# Patient Record
Sex: MALE | Race: WHITE | NOT HISPANIC OR LATINO | URBAN - METROPOLITAN AREA
[De-identification: names, ages, dates, MRNs, and addresses within clinical notes are randomized per-mention and may not be internally consistent; named-entity substitution may affect disease eponyms.]

---

## 2017-01-06 ENCOUNTER — OFFICE VISIT - HEALTHEAST (OUTPATIENT)
Dept: FAMILY MEDICINE | Facility: CLINIC | Age: 52
End: 2017-01-06

## 2017-01-06 DIAGNOSIS — E78.00 HYPERCHOLESTEREMIA: ICD-10-CM

## 2017-01-06 DIAGNOSIS — E88.810 METABOLIC SYNDROME: ICD-10-CM

## 2017-01-06 DIAGNOSIS — R06.83 SNORES: ICD-10-CM

## 2017-01-06 DIAGNOSIS — E78.1 HYPERTRIGLYCERIDEMIA: ICD-10-CM

## 2017-01-06 NOTE — ASSESSMENT & PLAN NOTE
51 y.o. year old male in clinic today to discuss treatment of the following conditions through diet and lifestyle modification and weight loss:  1. BMI 36.0-36.9,adult    2. Metabolic syndrome    3. Hypertriglyceridemia    4. Hypercholesteremia    5. Snores      The patient's efforts this past month were successful as evidenced by feeling well while losing weight.  No obvious side effects from metformin.  The patient has not started phentermine.  Hunger control is been quite good especially considering that this was the first month for this patient.   -Continue metformin.  Consider phentermine in the future.   -The patient will meet with nutrition and ways to wellness discuss breakfast options.   -The patient has been discouraged from utilizing protein shakes and protein bars as much as he has.

## 2017-01-12 ENCOUNTER — OFFICE VISIT - HEALTHEAST (OUTPATIENT)
Dept: AUDIOLOGY | Facility: CLINIC | Age: 52
End: 2017-01-12

## 2017-01-12 DIAGNOSIS — H93.13 TINNITUS, BILATERAL: ICD-10-CM

## 2017-01-12 DIAGNOSIS — H90.3 SENSORINEURAL HEARING LOSS, BILATERAL: ICD-10-CM

## 2017-02-03 ENCOUNTER — OFFICE VISIT - HEALTHEAST (OUTPATIENT)
Dept: FAMILY MEDICINE | Facility: CLINIC | Age: 52
End: 2017-02-03

## 2017-02-03 DIAGNOSIS — R73.09 ELEVATED HEMOGLOBIN A1C: ICD-10-CM

## 2017-02-03 DIAGNOSIS — E88.810 METABOLIC SYNDROME: ICD-10-CM

## 2017-02-03 DIAGNOSIS — E78.1 HYPERTRIGLYCERIDEMIA: ICD-10-CM

## 2017-02-03 DIAGNOSIS — R06.83 SNORES: ICD-10-CM

## 2017-02-03 DIAGNOSIS — E78.00 HYPERCHOLESTEREMIA: ICD-10-CM

## 2017-02-03 DIAGNOSIS — E55.9 AVITAMINOSIS D: ICD-10-CM

## 2017-02-03 NOTE — ASSESSMENT & PLAN NOTE
51 y.o. year old male in clinic today to discuss treatment of the following conditions through diet and lifestyle modification and weight loss:  1. BMI 36.0-36.9,adult    2. Hypercholesteremia    3. Hypertriglyceridemia    4. Snores    5. Metabolic syndrome      The patient's efforts this past month were successful as evidenced by lack of appetite and energy level.  Continues to make good food choices.    - continue metformin   - taper EtOH calories   - has not taken metformin   - fasting labs in April. Orders placed.

## 2017-03-27 ENCOUNTER — COMMUNICATION - HEALTHEAST (OUTPATIENT)
Dept: FAMILY MEDICINE | Facility: CLINIC | Age: 52
End: 2017-03-27

## 2017-03-27 ENCOUNTER — OFFICE VISIT - HEALTHEAST (OUTPATIENT)
Dept: FAMILY MEDICINE | Facility: CLINIC | Age: 52
End: 2017-03-27

## 2017-03-27 DIAGNOSIS — E78.1 HYPERTRIGLYCERIDEMIA: ICD-10-CM

## 2017-03-27 DIAGNOSIS — R06.83 SNORES: ICD-10-CM

## 2017-03-27 DIAGNOSIS — E78.00 HYPERCHOLESTEREMIA: ICD-10-CM

## 2017-03-27 DIAGNOSIS — E88.810 METABOLIC SYNDROME: ICD-10-CM

## 2017-03-27 NOTE — ASSESSMENT & PLAN NOTE
51 y.o. year old male in clinic today to discuss treatment of the following conditions through diet and lifestyle modification and weight loss:  1. BMI 36.0-36.9,adult    2. Metabolic syndrome    3. Snores    4. Hypertriglyceridemia    5. Hypercholesteremia      The patient's efforts this past month were successful as evidenced by continued success at losing weight.  I did recommend that he rededicate himself to tracking as it sounds like at times he is consuming excessive calories.  Unclear if he is actually plateaued which is his main concern today.  I also recommended increasing his physical activity during the week as his schedule allows.  We will plan on fasting lab work during the month of May.

## 2017-04-24 ENCOUNTER — OFFICE VISIT - HEALTHEAST (OUTPATIENT)
Dept: FAMILY MEDICINE | Facility: CLINIC | Age: 52
End: 2017-04-24

## 2017-04-24 DIAGNOSIS — R06.83 SNORES: ICD-10-CM

## 2017-04-24 DIAGNOSIS — E88.810 METABOLIC SYNDROME: ICD-10-CM

## 2017-04-24 DIAGNOSIS — E78.1 HYPERTRIGLYCERIDEMIA: ICD-10-CM

## 2017-04-24 DIAGNOSIS — E78.00 HYPERCHOLESTEREMIA: ICD-10-CM

## 2017-04-24 NOTE — ASSESSMENT & PLAN NOTE
51 y.o. year old male in clinic today to discuss treatment of the following conditions through diet and lifestyle modification and weight loss:  1. BMI 36.0-36.9,adult    2. Snores    3. Hypertriglyceridemia    4. Hypercholesteremia    5. Metabolic syndrome      The patient's efforts this past month were successful as evidenced by ongoing weight loss, increased physical activity recognition of pattern.  Reviewed Inbody analysis.   - continue to increase physical activity.   - work on breakfast

## 2017-06-19 ENCOUNTER — OFFICE VISIT - HEALTHEAST (OUTPATIENT)
Dept: FAMILY MEDICINE | Facility: CLINIC | Age: 52
End: 2017-06-19

## 2017-06-19 DIAGNOSIS — E78.00 HYPERCHOLESTEREMIA: ICD-10-CM

## 2017-06-19 DIAGNOSIS — E78.1 HYPERTRIGLYCERIDEMIA: ICD-10-CM

## 2017-06-19 DIAGNOSIS — E88.810 METABOLIC SYNDROME: ICD-10-CM

## 2017-06-19 DIAGNOSIS — R06.83 SNORES: ICD-10-CM

## 2017-06-19 NOTE — ASSESSMENT & PLAN NOTE
51 y.o. year old male in clinic today to discuss treatment of the following conditions through diet and lifestyle modification and weight loss:  1. BMI 36.0-36.9,adult    2. Snores    3. Metabolic syndrome    4. Hypertriglyceridemia    5. Hypercholesteremia      The patient's efforts this past month were successful as evidenced by weight stability but the patient feels like he has stalled out.  I recommend that the patient focus on logging/tracking.   - goal remains less than 200.   - trial of contrave.  The patient was intolerant of phentermine after several days.  For the past 6 months, he has been diligently tracking his food intake and focusing on increasing his physical activity.  He did find some initial success but he has prematurely plateaued.  He still is at risk for cholesterol related illnesses such as heart disease.  Additionally has metabolic syndrome and snoring.  I believe that he would benefit from a trial and possibly longer term use of contrave given his strong and sometimes overwhelming symptoms of food cravings.  The medication was sent in the pharmacy.  It appears that he will require prior authorization process.  I believe this medication is necessary to reduce his risk of heart disease in the future.

## 2017-08-04 ENCOUNTER — OFFICE VISIT - HEALTHEAST (OUTPATIENT)
Dept: FAMILY MEDICINE | Facility: CLINIC | Age: 52
End: 2017-08-04

## 2017-08-04 DIAGNOSIS — F32.9 MDD (MAJOR DEPRESSIVE DISORDER): ICD-10-CM

## 2017-08-04 DIAGNOSIS — E78.00 HYPERCHOLESTEREMIA: ICD-10-CM

## 2017-08-04 DIAGNOSIS — E88.810 METABOLIC SYNDROME: ICD-10-CM

## 2017-08-04 DIAGNOSIS — E78.1 HYPERTRIGLYCERIDEMIA: ICD-10-CM

## 2017-08-04 DIAGNOSIS — R06.83 SNORES: ICD-10-CM

## 2017-08-04 NOTE — ASSESSMENT & PLAN NOTE
51 y.o. year old male in clinic today to discuss treatment of the following conditions through diet and lifestyle modification and weight loss:  1. BMI 36.0-36.9,adult    2. MDD (major depressive disorder)    3. Hypercholesteremia    4. Hypertriglyceridemia    5. Snores    6. Metabolic syndrome      The patient's efforts this past month were successful as evidenced by weight loss although I am concerned that the patient is essentially skipping his breakfast and lunch because he is now on phentermine.  The patient has been taking phentermine to suppress his appetite.  He is consuming a protein bar for breakfast and lunch.  He has not been hungry.  He finds that his mood is been labile.  -The patient was encouraged to eat real food for breakfast and lunch.  -Stop phentermine.  Start diethylpropion.  -Plan to review the patient's intake at our next visit.  The patient was told that I am concerned he will struggle with maintenance of weight loss given the type of weight loss is experience over the past month.

## 2017-08-10 ENCOUNTER — COMMUNICATION - HEALTHEAST (OUTPATIENT)
Dept: FAMILY MEDICINE | Facility: CLINIC | Age: 52
End: 2017-08-10

## 2017-08-11 ENCOUNTER — RECORDS - HEALTHEAST (OUTPATIENT)
Dept: ADMINISTRATIVE | Facility: OTHER | Age: 52
End: 2017-08-11

## 2017-12-04 ENCOUNTER — RECORDS - HEALTHEAST (OUTPATIENT)
Dept: ADMINISTRATIVE | Facility: OTHER | Age: 52
End: 2017-12-04

## 2017-12-11 ENCOUNTER — OFFICE VISIT - HEALTHEAST (OUTPATIENT)
Dept: FAMILY MEDICINE | Facility: CLINIC | Age: 52
End: 2017-12-11

## 2017-12-11 DIAGNOSIS — E78.00 HYPERCHOLESTEREMIA: ICD-10-CM

## 2017-12-11 DIAGNOSIS — E88.810 METABOLIC SYNDROME: ICD-10-CM

## 2017-12-11 DIAGNOSIS — R73.09 ELEVATED HEMOGLOBIN A1C: ICD-10-CM

## 2017-12-11 DIAGNOSIS — R06.83 SNORES: ICD-10-CM

## 2017-12-11 DIAGNOSIS — Z13.1 SCREENING FOR DIABETES MELLITUS: ICD-10-CM

## 2017-12-11 DIAGNOSIS — Z23 NEED FOR INFLUENZA VACCINATION: ICD-10-CM

## 2017-12-11 DIAGNOSIS — E78.1 HYPERTRIGLYCERIDEMIA: ICD-10-CM

## 2017-12-11 DIAGNOSIS — E55.9 AVITAMINOSIS D: ICD-10-CM

## 2017-12-11 LAB
CHOLEST SERPL-MCNC: 212 MG/DL
FASTING STATUS PATIENT QL REPORTED: YES
HBA1C MFR BLD: 5.4 % (ref 3.5–6)
HDLC SERPL-MCNC: 45 MG/DL
LDLC SERPL CALC-MCNC: 134 MG/DL
TRIGL SERPL-MCNC: 166 MG/DL

## 2017-12-11 NOTE — ASSESSMENT & PLAN NOTE
52 y.o. year old male in clinic today to discuss treatment of the following conditions through diet and lifestyle modification and weight loss:  1. BMI 36.0-36.9,adult    2. Need for influenza vaccination    3. Hypercholesteremia    4. Hypertriglyceridemia    5. Snores    6. Metabolic syndrome    7. Screening for diabetes mellitus    8. Avitaminosis D      This patient returns to clinic after a 3-4 month absence.  He found diethylpropion to be unhelpful in that it caused emotional lability.  This is discontinued.  He takes metformin intermittently.Most importantly, the patient now has access to a kitchen as he is completed a lengthy remodel of his home.  He is excited to resume.  -We discussed and revised his macronutrient targets.  -Continue metformin.  We will repeat laboratory testing today to support the diagnosis of metabolic syndrome.  -Been 12-13 months since the last time he had fasting lab work.  Fasting lab work today.  -Return to clinic in 1 month.

## 2018-01-08 ENCOUNTER — COMMUNICATION - HEALTHEAST (OUTPATIENT)
Dept: FAMILY MEDICINE | Facility: CLINIC | Age: 53
End: 2018-01-08

## 2019-01-23 ENCOUNTER — OFFICE VISIT - HEALTHEAST (OUTPATIENT)
Dept: FAMILY MEDICINE | Facility: CLINIC | Age: 54
End: 2019-01-23

## 2019-01-23 DIAGNOSIS — E88.810 METABOLIC SYNDROME: ICD-10-CM

## 2019-01-23 DIAGNOSIS — E78.00 HYPERCHOLESTEREMIA: ICD-10-CM

## 2019-01-23 DIAGNOSIS — E66.01 CLASS 2 SEVERE OBESITY DUE TO EXCESS CALORIES WITH SERIOUS COMORBIDITY AND BODY MASS INDEX (BMI) OF 35.0 TO 35.9 IN ADULT (H): ICD-10-CM

## 2019-01-23 DIAGNOSIS — R06.83 SNORES: ICD-10-CM

## 2019-01-23 DIAGNOSIS — E78.1 HYPERTRIGLYCERIDEMIA: ICD-10-CM

## 2019-01-23 DIAGNOSIS — F51.04 PSYCHOPHYSIOLOGICAL INSOMNIA: ICD-10-CM

## 2019-01-23 DIAGNOSIS — Z00.00 ROUTINE GENERAL MEDICAL EXAMINATION AT A HEALTH CARE FACILITY: ICD-10-CM

## 2019-01-23 DIAGNOSIS — E55.9 AVITAMINOSIS D: ICD-10-CM

## 2019-01-23 DIAGNOSIS — F41.1 ANXIETY STATE: ICD-10-CM

## 2019-01-23 DIAGNOSIS — E66.812 CLASS 2 SEVERE OBESITY DUE TO EXCESS CALORIES WITH SERIOUS COMORBIDITY AND BODY MASS INDEX (BMI) OF 35.0 TO 35.9 IN ADULT (H): ICD-10-CM

## 2019-01-23 DIAGNOSIS — N40.2 PROSTATE NODULE: ICD-10-CM

## 2019-01-23 ASSESSMENT — MIFFLIN-ST. JEOR: SCORE: 2077.97

## 2019-01-23 NOTE — ASSESSMENT & PLAN NOTE
The patient has gained weight since his most recent visit 13 months ago.  He and I had a lengthy conversation regarding strategies towards weight loss.  At this time, he is patient's as he struggled with side effects previously.  We discussed a framework of intermittent fasting, careful planning/prepping of meals, increase physical activity.  The patient was given a list of resources to help facilitate the above.  I have recommended that he return to clinic in 3 months for a follow-up appointment and that we consider (or reconsider) anorexigenic medications at that time.  He may be an excellent candidate for locaserin or liraglutide assuming he gets coverage.

## 2019-01-23 NOTE — ASSESSMENT & PLAN NOTE
This patient returns for an annual exam.  Is a new complaint of insomnia which is likely multifactorial and discussed elsewhere.  He and I had a lengthy conversation regarding overall health as it relates to his weight and a previous diagnosis of metabolic syndrome.  He will work to improve this condition.  We reviewed his family health history.  We have been checking a PSA over the past few years given his previous history of a prostate nodule which was found incidentally individual rectal exam and a routine exam in his 30s.  Anxiety/depression are reasonably well controlled and anticipate improvement with improved sleep.

## 2019-01-23 NOTE — ASSESSMENT & PLAN NOTE
New complaint today.   Comorbid anxiety and depression which are reasonably well controlled.  Early awakening insomnia with some rumination.  We discussed sleep hygiene as well as increased/improved nutrition as an initial response.  Patient was given a lengthy handout.  He should also consider a cognitive behavioral therapy-insomnia mino as a more direct intervention.

## 2019-01-23 NOTE — ASSESSMENT & PLAN NOTE
Fasting lab work will be completed in the next week to evaluate current status of this condition.  Anticipate worsening given his weight gain.  Suggests he may disproportionally benefit from liraglutide as well as an intermittent fasting framework.

## 2019-01-23 NOTE — ASSESSMENT & PLAN NOTE
Worsening with weight gain.  Consider referral for sleep medicine.  Patient declines at this time.

## 2019-01-23 NOTE — ASSESSMENT & PLAN NOTE
The patient's symptoms overall are well controlled although he has developed early awakening insomnia with some rumination.  We discussed sleep hygiene as well as increased/improved nutrition as an initial response.  Patient was given a lengthy handout.  He should also consider a cognitive behavioral therapy-insomnia mino as a more direct intervention.

## 2019-03-25 ENCOUNTER — COMMUNICATION - HEALTHEAST (OUTPATIENT)
Dept: FAMILY MEDICINE | Facility: CLINIC | Age: 54
End: 2019-03-25

## 2019-03-25 ENCOUNTER — COMMUNICATION - HEALTHEAST (OUTPATIENT)
Dept: LAB | Facility: CLINIC | Age: 54
End: 2019-03-25

## 2019-03-25 DIAGNOSIS — E78.1 HYPERTRIGLYCERIDEMIA: ICD-10-CM

## 2019-03-25 DIAGNOSIS — E55.9 AVITAMINOSIS D: ICD-10-CM

## 2019-03-25 DIAGNOSIS — N40.2 PROSTATE NODULE: ICD-10-CM

## 2019-03-25 DIAGNOSIS — R73.09 ELEVATED HEMOGLOBIN A1C: ICD-10-CM

## 2019-03-29 ENCOUNTER — AMBULATORY - HEALTHEAST (OUTPATIENT)
Dept: LAB | Facility: CLINIC | Age: 54
End: 2019-03-29

## 2019-03-29 DIAGNOSIS — E55.9 AVITAMINOSIS D: ICD-10-CM

## 2019-03-29 DIAGNOSIS — E78.1 HYPERTRIGLYCERIDEMIA: ICD-10-CM

## 2019-03-29 DIAGNOSIS — N40.2 PROSTATE NODULE: ICD-10-CM

## 2019-03-29 DIAGNOSIS — R73.09 ELEVATED HEMOGLOBIN A1C: ICD-10-CM

## 2019-03-29 LAB
25(OH)D3 SERPL-MCNC: 15.2 NG/ML (ref 30–80)
25(OH)D3 SERPL-MCNC: 15.2 NG/ML (ref 30–80)
CHOLEST SERPL-MCNC: 226 MG/DL
FASTING STATUS PATIENT QL REPORTED: YES
HBA1C MFR BLD: 5.5 % (ref 3.5–6)
HDLC SERPL-MCNC: 50 MG/DL
LDLC SERPL CALC-MCNC: 141 MG/DL
PSA SERPL-MCNC: 0.9 NG/ML (ref 0–3.5)
TRIGL SERPL-MCNC: 173 MG/DL

## 2019-04-02 ENCOUNTER — AMBULATORY - HEALTHEAST (OUTPATIENT)
Dept: FAMILY MEDICINE | Facility: CLINIC | Age: 54
End: 2019-04-02

## 2019-04-02 DIAGNOSIS — E55.9 AVITAMINOSIS D: ICD-10-CM

## 2021-05-27 NOTE — TELEPHONE ENCOUNTER
Cancelled orders in your box, I will send a my-chart message to schedule a lab appointment    Thank you,  Kayli Funes LPN

## 2021-05-27 NOTE — TELEPHONE ENCOUNTER
Orders being requested:  Lipid, vitamin D, A1C  Reason service is needed/diagnosis:  Follow up labs  When are orders needed by: ASAP  Where to send Orders: Chart  Okay to leave detailed message?  Yes

## 2021-05-27 NOTE — TELEPHONE ENCOUNTER
Patient coming to  lab on 3/29/19 at 0800 for labs. Please review chart and place orders.     Thank you!

## 2021-05-30 VITALS — BODY MASS INDEX: 34.72 KG/M2 | WEIGHT: 256 LBS

## 2021-05-30 VITALS — WEIGHT: 243.9 LBS | BODY MASS INDEX: 33.08 KG/M2

## 2021-05-30 VITALS — BODY MASS INDEX: 34.11 KG/M2 | WEIGHT: 251.5 LBS

## 2021-05-30 VITALS — BODY MASS INDEX: 33.23 KG/M2 | WEIGHT: 245 LBS

## 2021-05-31 VITALS — WEIGHT: 243 LBS | BODY MASS INDEX: 32.96 KG/M2

## 2021-05-31 VITALS — BODY MASS INDEX: 33.5 KG/M2 | WEIGHT: 247 LBS

## 2021-05-31 VITALS — WEIGHT: 233 LBS | BODY MASS INDEX: 31.6 KG/M2

## 2021-06-02 VITALS — HEIGHT: 73 IN | BODY MASS INDEX: 35.12 KG/M2 | WEIGHT: 265 LBS

## 2021-06-08 NOTE — PROGRESS NOTES
"Assessment and Plan:     BMI 36.0-36.9,adult  51 y.o. year old male in clinic today to discuss treatment of the following conditions through diet and lifestyle modification and weight loss:  1. BMI 36.0-36.9,adult    2. Metabolic syndrome    3. Hypertriglyceridemia    4. Hypercholesteremia    5. Snores      The patient's efforts this past month were successful as evidenced by feeling well while losing weight.  No obvious side effects from metformin.  The patient has not started phentermine.  Hunger control is been quite good especially considering that this was the first month for this patient.   -Continue metformin.  Consider phentermine in the future.   -The patient will meet with nutrition and ways to wellness discuss breakfast options.   -The patient has been discouraged from utilizing protein shakes and protein bars as much as he has.    Follow up in 1 month.  Continue medications.  Continue supplements.    Recommend increasing movement throughout the day--sitting less, moving more.  Will increase activity over time to reach a goal of at least 150 minutes of moderate exercise each week.  Behavior modification:  Cognitive restructuring exercises, journaling stressors, triggers for food cravings.  Dietary Intervention:  Reduced calorie, reduced carbohydrates, whole food diet.  Greater than 50% of this 15 minute visit was spent in counseling regarding patient's obesity, medications, dietary, exercise, and behavior modification recommendations.      Subjective  Patient presents for treatment of chronic, comorbid conditions using intensive therapeutic lifestyle interventions including nutrition, physical activity, and behavior management.    Feels good  Dropped down below 250 lbs  Wants to add real food.  Carbs are hard.  \"I realize that this is about carbohydrate addiction\" - feels like this is quiting smoking.  Using lose-it.    Are you experiencing any side effects to the medications:  No, only taking " metforming  Hunger control:  good  Exercise was discussed: yes  Taking supplements:  yes  Discussed journaling food:  yes  Patient is pleased with the current results:  yes  The patient is following the nutrition plan:  yes  Barriers to losing weight:  Behavior:  inadequate exercise    Patient Active Problem List   Diagnosis     Allergies     Anxiety Disorder NOS     MDD (major depressive disorder)     Insomnia     BMI 36.0-36.9,adult     Metabolic syndrome     Hypertriglyceridemia     Hypercholesteremia     Snores       Current Outpatient Prescriptions on File Prior to Visit   Medication Sig Dispense Refill     ergocalciferol (ERGOCALCIFEROL) 50,000 unit capsule Take 1 capsule (50,000 Units total) by mouth once a week for 12 doses. 12 capsule 0     metFORMIN (GLUCOPHAGE XR) 500 MG 24 hr tablet Take 1 tablet (500 mg total) by mouth daily with supper. 90 tablet 1     No current facility-administered medications on file prior to visit.        Objective:  Vitals:    01/06/17 0905   BP: 124/70   Pulse: 72     Initial Weight: 270 lbs  Weight: 256 lb (116.1 kg)  Weight loss from initial: 14  % Weight loss: 5.19 %  Body mass index is 34.72 kg/(m^2).  General:  Patient is alert, pleasant, no distress.  Patient is obese.    This note has been dictated using voice recognition software. Any grammatical or context distortions are unintentional and inherent to the software

## 2021-06-08 NOTE — PROGRESS NOTES
Audiology Report:    Referring Provider:  Dr. Chowdhury    History:  Jones Miller is seen today for comprehensive hearing evaluation. He has a history of difficulty hearing and tinnitus for the past 2 years. He states that he has a difficult time hearing people when he is in loud settings. He reports that he experiences constant bilateral ringing in his ears that is not very noticeable or bothersome. He states he has a positive history of noise exposure due to listening to amplified music without hearing protection when he was younger. He reports he now utilizes hearing protection regularly when needed. He denies a history of otalgia, dizziness, aural fullness and otorrhea, ear surgery, and family history of hearing loss..    Results:     Left Ear Right Ear   Otoscopy clear canals clear canals   Pure Tone Audiometry normal hearing 250-1000 Hz sloping to mild sensorineural hearing loss   normal hearing 250-1000 Hz sloping to mild sensorineural hearing loss   Word Recognition excellent excellent   Tympanometry normal (Type A)  normal (Type A)     Transducer: Insert earphones    Reliability was good  and there was good  SRT to PTA agreement.       Plan:  Results are discussed in detail.  He should return for retesting in 2-3 years.  The patient is counseled on tinnitus, hearing protection, and listening strategies. Hearing aid candidacy was briefly discussed.    Please see audiogram under  media  and  audiogram  in the patient s chart.     Felisa Webber., CCC-A  [place] Licensed Audiologist #[id number]

## 2021-06-08 NOTE — PROGRESS NOTES
Assessment and Plan:     BMI 36.0-36.9,adult  51 y.o. year old male in clinic today to discuss treatment of the following conditions through diet and lifestyle modification and weight loss:  1. BMI 36.0-36.9,adult    2. Hypercholesteremia    3. Hypertriglyceridemia    4. Snores    5. Metabolic syndrome      The patient's efforts this past month were successful as evidenced by lack of appetite and energy level.  Continues to make good food choices.    - continue metformin   - taper EtOH calories   - has not taken metformin   - fasting labs in April. Orders placed.     Follow up in 1 month.  Continue medications.  Continue supplements.    Recommend increasing movement throughout the day--sitting less, moving more.  Will increase activity over time to reach a goal of at least 150 minutes of moderate exercise each week.  Behavior modification:  Cognitive restructuring exercises, journaling stressors, triggers for food cravings.  Dietary Intervention:  Reduced calorie, reduced carbohydrates, whole food diet.  Greater than 50% of this 15 minute visit was spent in counseling regarding patient's obesity, medications, dietary, exercise, and behavior modification recommendations.      Subjective  Patient presents for treatment of chronic, comorbid conditions using intensive therapeutic lifestyle interventions including nutrition, physical activity, and behavior management.    Doing pretty good.  Struggles with variety.  Met with .  Protein drinks in morning.  Not hungry.  Continues to be a carb addict.  Stress is trigger.  Physical activity: Walking.  3 and 5 mile walks on weekends.  Hikes with dog.      Are you experiencing any side effects to the medications:  No  Hunger control:  good  Exercise was discussed: yes  Taking supplements:  yes  Discussed journaling food:  yes  Patient is pleased with the current results:  yes  The patient is following the nutrition plan:  yes  Barriers to losing weight:  Behavior:   inadequate exercise    Patient Active Problem List   Diagnosis     Allergies     Anxiety Disorder NOS     MDD (major depressive disorder)     Insomnia     BMI 36.0-36.9,adult     Metabolic syndrome     Hypertriglyceridemia     Hypercholesteremia     Snores       Current Outpatient Prescriptions on File Prior to Visit   Medication Sig Dispense Refill     metFORMIN (GLUCOPHAGE XR) 500 MG 24 hr tablet Take 1 tablet (500 mg total) by mouth daily with supper. 90 tablet 1     [DISCONTINUED] phentermine (ADIPEX-P) 37.5 mg tablet Take 18.75 mg by mouth 2 (two) times a day.       No current facility-administered medications on file prior to visit.        Objective:  Vitals:    02/03/17 1600   BP: 124/72   Pulse: 68     Initial Weight: 270 lbs  Weight: 251 lb 8 oz (114.1 kg)  Weight loss from initial: 18.5  % Weight loss: 6.85 %  Body mass index is 34.11 kg/(m^2).  General:  Patient is alert, pleasant, no distress.  Patient is obese.    This note has been dictated using voice recognition software. Any grammatical or context distortions are unintentional and inherent to the software

## 2021-06-09 NOTE — PROGRESS NOTES
"Assessment and Plan:     BMI 36.0-36.9,adult  51 y.o. year old male in clinic today to discuss treatment of the following conditions through diet and lifestyle modification and weight loss:  1. BMI 36.0-36.9,adult    2. Metabolic syndrome    3. Snores    4. Hypertriglyceridemia    5. Hypercholesteremia      The patient's efforts this past month were successful as evidenced by continued success at losing weight.  I did recommend that he rededicate himself to tracking as it sounds like at times he is consuming excessive calories.  Unclear if he is actually plateaued which is his main concern today.  I also recommended increasing his physical activity during the week as his schedule allows.  We will plan on fasting lab work during the month of May.    Follow up in 1 month.  Continue medications.  Continue supplements.    Recommend increasing movement throughout the day--sitting less, moving more.  Will increase activity over time to reach a goal of at least 150 minutes of moderate exercise each week.  Behavior modification:  Cognitive restructuring exercises, journaling stressors, triggers for food cravings.  Dietary Intervention:  Reduced calorie, reduced carbohydrates, whole food diet.  Greater than 50% of this 15 minute visit was spent in counseling regarding patient's obesity, medications, dietary, exercise, and behavior modification recommendations.      Subjective  Patient presents for treatment of chronic, comorbid conditions using intensive therapeutic lifestyle interventions including nutrition, physical activity, and behavior management.    Hitting wall.  Not seeing results as quickly.  Not tracking.  Does not believe that he is tracking.  Walking on weekends.    Carb calories.  \"sugar alcohols\"    Are you experiencing any side effects to the medications:  No  Hunger control:  good  Exercise was discussed: yes  Taking supplements:  yes  Discussed journaling food:  yes  Patient is pleased with the current " results:  yes  The patient is following the nutrition plan:  yes  Barriers to losing weight:  Behavior:  inadequate exercise    Patient Active Problem List   Diagnosis     Allergies     Anxiety Disorder NOS     MDD (major depressive disorder)     Insomnia     BMI 36.0-36.9,adult     Metabolic syndrome     Hypertriglyceridemia     Hypercholesteremia     Snores       Current Outpatient Prescriptions on File Prior to Visit   Medication Sig Dispense Refill     [DISCONTINUED] metFORMIN (GLUCOPHAGE XR) 500 MG 24 hr tablet Take 1 tablet (500 mg total) by mouth daily with supper. 90 tablet 1     No current facility-administered medications on file prior to visit.        Objective:  Vitals:    03/27/17 0817   BP: 124/64   Pulse: 74     Initial Weight: 270 lbs  Weight: 245 lb (111.1 kg)  Weight loss from initial: 25  % Weight loss: 9.26 %  Body mass index is 33.23 kg/(m^2).  General:  Patient is alert, pleasant, no distress.  Patient is obese.    This note has been dictated using voice recognition software. Any grammatical or context distortions are unintentional and inherent to the software

## 2021-06-10 NOTE — PROGRESS NOTES
Assessment and Plan:     BMI 36.0-36.9,adult  51 y.o. year old male in clinic today to discuss treatment of the following conditions through diet and lifestyle modification and weight loss:  1. BMI 36.0-36.9,adult    2. Snores    3. Hypertriglyceridemia    4. Hypercholesteremia    5. Metabolic syndrome      The patient's efforts this past month were successful as evidenced by ongoing weight loss, increased physical activity recognition of pattern.  Reviewed Inbody analysis.   - continue to increase physical activity.   - work on breakfast    Follow up in 1 month.  Continue medications.  Continue supplements.    Recommend increasing movement throughout the day--sitting less, moving more.  Will increase activity over time to reach a goal of at least 150 minutes of moderate exercise each week.  Behavior modification:  Cognitive restructuring exercises, journaling stressors, triggers for food cravings.  Dietary Intervention:  Reduced calorie, reduced carbohydrates, whole food diet.  Greater than 50% of this 15 minute visit was spent in counseling regarding patient's obesity, medications, dietary, exercise, and behavior modification recommendations.      Subjective  Patient presents for treatment of chronic, comorbid conditions using intensive therapeutic lifestyle interventions including nutrition, physical activity, and behavior management.    Walking more.  Has a routine.  Carbs slipping back into diet. (beer)  Had anxiety with phentermine.      Are you experiencing any side effects to the medications:  No  Hunger control:  good  Exercise was discussed: yes  Taking supplements:  yes  Discussed journaling food:  yes  Patient is pleased with the current results:  yes  The patient is following the nutrition plan:  yes  Barriers to losing weight:  Behavior:  inadequate exercise, alcohol.    Patient Active Problem List   Diagnosis     Allergies     Anxiety Disorder NOS     MDD (major depressive disorder)     Insomnia      BMI 36.0-36.9,adult     Metabolic syndrome     Hypertriglyceridemia     Hypercholesteremia     Snores       Current Outpatient Prescriptions on File Prior to Visit   Medication Sig Dispense Refill     metFORMIN (GLUCOPHAGE XR) 500 MG 24 hr tablet Take 1 tablet (500 mg total) by mouth daily with supper. 90 tablet 1     No current facility-administered medications on file prior to visit.        Objective:  Vitals:    04/24/17 0806   BP: 126/72   Pulse: 68     Initial Weight: 270 lbs  Weight: 243 lb 14.4 oz (110.6 kg)  Weight loss from initial: 26.1  % Weight loss: 9.67 %  Body mass index is 33.08 kg/(m^2).  General:  Patient is alert, pleasant, no distress.  Patient is obese.    This note has been dictated using voice recognition software. Any grammatical or context distortions are unintentional and inherent to the software

## 2021-06-11 NOTE — PROGRESS NOTES
Assessment and Plan:     BMI 36.0-36.9,adult  51 y.o. year old male in clinic today to discuss treatment of the following conditions through diet and lifestyle modification and weight loss:  1. BMI 36.0-36.9,adult    2. Snores    3. Metabolic syndrome    4. Hypertriglyceridemia    5. Hypercholesteremia      The patient's efforts this past month were successful as evidenced by weight stability but the patient feels like he has stalled out.  I recommend that the patient focus on logging/tracking.   - goal remains less than 200.   - trial of contrave.  The patient was intolerant of phentermine after several days.  For the past 6 months, he has been diligently tracking his food intake and focusing on increasing his physical activity.  He did find some initial success but he has prematurely plateaued.  He still is at risk for cholesterol related illnesses such as heart disease.  Additionally has metabolic syndrome and snoring.  I believe that he would benefit from a trial and possibly longer term use of contrave given his strong and sometimes overwhelming symptoms of food cravings.  The medication was sent in the pharmacy.  It appears that he will require prior authorization process.  I believe this medication is necessary to reduce his risk of heart disease in the future.      Follow up in 1 month.  Continue medications.  Continue supplements.    Recommend increasing movement throughout the day--sitting less, moving more.  Will increase activity over time to reach a goal of at least 150 minutes of moderate exercise each week.  Behavior modification:  Cognitive restructuring exercises, journaling stressors, triggers for food cravings.  Dietary Intervention:  Reduced calorie, reduced carbohydrates, whole food diet.  Greater than 50% of this 15 minute visit was spent in counseling regarding patient's obesity, medications, dietary, exercise, and behavior modification recommendations.    Subjective  Patient presents for  treatment of chronic, comorbid conditions using intensive therapeutic lifestyle interventions including nutrition, physical activity, and behavior management.    Motivation has not been as high.  Increased stress at work.  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  Gets derailed with client lunches.  Has increased physical activity    Are you experiencing any side effects to the medications:  No  Hunger control:  good  Exercise was discussed: yes  Taking supplements:  yes  Discussed journaling food:  yes  Patient is pleased with the current results:  no  The patient is following the nutrition plan:  no  Barriers to losing weight:  Behavior:  social calories    Patient Active Problem List   Diagnosis     Allergies     Anxiety Disorder NOS     MDD (major depressive disorder)     Insomnia     BMI 36.0-36.9,adult     Metabolic syndrome     Hypertriglyceridemia     Hypercholesteremia     Snores       Current Outpatient Prescriptions on File Prior to Visit   Medication Sig Dispense Refill     metFORMIN (GLUCOPHAGE XR) 500 MG 24 hr tablet Take 1 tablet (500 mg total) by mouth daily with supper. 90 tablet 1     No current facility-administered medications on file prior to visit.        Objective:  Vitals:    06/19/17 1539   BP: 128/76   Pulse: 64     Initial Weight: 270 lbs  Weight: 243 lb (110.2 kg)  Weight loss from initial: 27  % Weight loss: 10 %  Body mass index is 32.96 kg/(m^2).  General:  Patient is alert, pleasant, no distress.  Patient is obese.    This note has been dictated using voice recognition software. Any grammatical or context distortions are unintentional and inherent to the software

## 2021-06-12 NOTE — PROGRESS NOTES
Assessment and Plan:     BMI 36.0-36.9,adult  51 y.o. year old male in clinic today to discuss treatment of the following conditions through diet and lifestyle modification and weight loss:  1. BMI 36.0-36.9,adult    2. MDD (major depressive disorder)    3. Hypercholesteremia    4. Hypertriglyceridemia    5. Snores    6. Metabolic syndrome      The patient's efforts this past month were successful as evidenced by weight loss although I am concerned that the patient is essentially skipping his breakfast and lunch because he is now on phentermine.  The patient has been taking phentermine to suppress his appetite.  He is consuming a protein bar for breakfast and lunch.  He has not been hungry.  He finds that his mood is been labile.  -The patient was encouraged to eat real food for breakfast and lunch.  -Stop phentermine.  Start diethylpropion.  -Plan to review the patient's intake at our next visit.  The patient was told that I am concerned he will struggle with maintenance of weight loss given the type of weight loss is experience over the past month.    Follow up in 1 month.  Continue supplements.    Recommend increasing movement throughout the day--sitting less, moving more.  Will increase activity over time to reach a goal of at least 150 minutes of moderate exercise each week.  Behavior modification:  Cognitive restructuring exercises, journaling stressors, triggers for food cravings.  Dietary Intervention:  Reduced calorie, reduced carbohydrates, whole food diet.  Greater than 50% of this 15 minute visit was spent in counseling regarding patient's obesity, medications, dietary, exercise, and behavior modification recommendations.    Subjective  Patient presents for treatment of chronic, comorbid conditions using intensive therapeutic lifestyle interventions including nutrition, physical activity, and behavior management.    Feels good  Now taking phentermine (contrave was not available).  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  Eating protein bar for breakfast and lunch.  Sensible dinner.  Not walking as much as previously.    Are you experiencing any side effects to the medications:  No  Hunger control:  good  Exercise was discussed: yes  Taking supplements:  yes  Discussed journaling food:  yes  Patient is pleased with the current results:  yes  The patient is following the nutrition plan:  Skipping meals  Barriers to losing weight:  Behavior:  skipping meals    Patient Active Problem List   Diagnosis     Allergies     Anxiety Disorder NOS     MDD (major depressive disorder)     Insomnia     BMI 36.0-36.9,adult     Metabolic syndrome     Hypertriglyceridemia     Hypercholesteremia     Snores       Current Outpatient Prescriptions on File Prior to Visit   Medication Sig Dispense Refill     metFORMIN (GLUCOPHAGE XR) 500 MG 24 hr tablet Take 1 tablet (500 mg total) by mouth daily with supper. 90 tablet 1     [DISCONTINUED] naltrexone-bupropion (CONTRAVE) 8-90 mg TbER Wk 1: 1 tablet QD.  Wk 2: 1 tablet BID. Wk 3: 2 tablets QAM, 1 tablet QPM. Wk 4+: 2 tablets BID 70 tablet 0     No current facility-administered medications on file prior to visit.        Objective:  Vitals:    08/04/17 1528   BP: 118/74   Pulse: 72     Initial Weight: 270 lbs  Weight: 233 lb (105.7 kg)  Weight loss from initial: 37  % Weight loss: 13.7 %  Body mass index is 31.6 kg/(m^2).  General:  Patient is alert, pleasant, no distress.  Patient is obese.    This note has been dictated using voice recognition software. Any grammatical or context distortions are unintentional and inherent to the software  "

## 2021-06-14 NOTE — PROGRESS NOTES
"Assessment and Plan:     BMI 36.0-36.9,adult  52 y.o. year old male in clinic today to discuss treatment of the following conditions through diet and lifestyle modification and weight loss:  1. BMI 36.0-36.9,adult    2. Need for influenza vaccination    3. Hypercholesteremia    4. Hypertriglyceridemia    5. Snores    6. Metabolic syndrome    7. Screening for diabetes mellitus    8. Avitaminosis D      This patient returns to clinic after a 3-4 month absence.  He found diethylpropion to be unhelpful in that it caused emotional lability.  This is discontinued.  He takes metformin intermittently.  Most importantly, the patient now has access to a kitchen as he is completed a lengthy remodel of his home.  He is excited to resume.  -We discussed and revised his macronutrient targets.  -Continue metformin.  We will repeat laboratory testing today to support the diagnosis of metabolic syndrome.  -Been 12-13 months since the last time he had fasting lab work.  Fasting lab work today.  -Return to clinic in 1 month.    Follow up in 1 month.  Continue supplements.    Recommend increasing movement throughout the day--sitting less, moving more.  Will increase activity over time to reach a goal of at least 150 minutes of moderate exercise each week.  Behavior modification:  Cognitive restructuring exercises, journaling stressors, triggers for food cravings.  Dietary Intervention:  Reduced calorie, reduced carbohydrates, whole food diet.  Greater than 50% of this 25 minute visit was spent in counseling regarding patient's obesity, medications, dietary, exercise, and behavior modification recommendations.      Subjective  Patient presents for treatment of chronic, comorbid conditions using intensive therapeutic lifestyle interventions including nutrition, physical activity, and behavior management.    Takes metformin intermittently. He found that he was \"edgey\" for appetite suppressing medications.  He just completed his kitchen (5 " "1/2 months without kitchen).  He feels like he has survived the process of kitchen remodel.      Are you experiencing any side effects to the medications:  Yes  Hunger control:  poor  Exercise was discussed: yes  Taking supplements:  yes  Discussed journaling food:  yes  Patient is pleased with the current results:  yes  The patient is following the nutrition plan:  no  Barriers to losing weight:  Skipping meals, \"let go\" of plan.      Patient Active Problem List   Diagnosis     Allergies     Anxiety Disorder NOS     MDD (major depressive disorder)     Insomnia     BMI 36.0-36.9,adult     Metabolic syndrome     Hypertriglyceridemia     Hypercholesteremia     Snores       Current Outpatient Prescriptions on File Prior to Visit   Medication Sig Dispense Refill     metFORMIN (GLUCOPHAGE XR) 500 MG 24 hr tablet Take 1 tablet (500 mg total) by mouth daily with supper. 90 tablet 1     [DISCONTINUED] diethylpropion 75 mg TbER Take 75 mg by mouth daily. 30 each 0     No current facility-administered medications on file prior to visit.        Objective:  Vitals:    12/11/17 0732   BP: 128/76   Pulse: 72     Initial Weight: 270 lbs  Weight: 247 lb (112 kg)  Weight loss from initial: 23  % Weight loss: 8.52 %  Body mass index is 33.5 kg/(m^2).  General:  Patient is alert, pleasant, no distress.  Patient is obese.    This note has been dictated using voice recognition software. Any grammatical or context distortions are unintentional and inherent to the software  "

## 2021-06-16 PROBLEM — N40.2 PROSTATE NODULE: Status: ACTIVE | Noted: 2019-01-23

## 2021-06-16 PROBLEM — E55.9 AVITAMINOSIS D: Status: ACTIVE | Noted: 2019-04-02

## 2021-06-16 PROBLEM — Z00.00 ROUTINE GENERAL MEDICAL EXAMINATION AT A HEALTH CARE FACILITY: Status: ACTIVE | Noted: 2019-01-24

## 2021-06-17 NOTE — PATIENT INSTRUCTIONS - HE
Patient Instructions by Feliberto Knight MD at 1/23/2019  4:00 PM     Author: Feliberto Knight MD Service: -- Author Type: Physician    Filed: 1/23/2019  4:35 PM Encounter Date: 1/23/2019 Status: Addendum    : Feliberto Knight MD (Physician)    Related Notes: Original Note by Feliberto Knight MD (Physician) filed at 1/23/2019  4:35 PM       Dr. Jeramy Yañez MD   - The Obesity Code   - Valentin [employer]    Dr. Pedro Howard MD   - Always Hungry    Dr. Carmela Yeboah MD.   Search for her name in [employer] with added criteria: [employer]      ______________________________________________   - Google: sleep hygiene (American Sleep Association)   - Try Melatonin  (5 mg 1-2 hours before bedtime ---> for you, consider at bedtime)   - CBT-I (cognitive behavioral therapy for insomnia)    Sleep Hygiene Tips - Research & Treatments  Getting good sleep is important in maintaining health. There are several things that you can do to promote good sleep and sleep hygiene, and ultimately Get Better Sleep.  What is sleep hygiene?  Sleep hygiene is defined as behaviors that one can do to help promote good sleep using behavioral interventions.  Sleep hygiene tips:  Maintain a regular sleep routine  Go to bed at the same time. Wake up at the same time. Ideally, your schedule will remain the same (+/- 20 minutes) every night of the week.  Avoid naps if possible  Naps decrease the Sleep Debt that is so necessary for easy sleep onset.   Each of us needs a certain amount of sleep per 24-hour period. We need that amount, and we dont need more than that.   When we take naps, it decreases the amount of sleep that we need the next night - which may cause sleep fragmentation and difficulty initiating sleep, and may lead to insomnia.  Dont stay in bed awake for more than 5-10 minutes.  If you find your mind racing, or worrying about not being able to sleep during the middle of the night, get out of bed, and sit in a chair in the dark. Do your  mind racing in the chair until you are sleepy, then return to bed. No TV or internet during these periods! That will just stimulate you more than desired.  If this happens several times during the night, that is OK. Just maintain your regular wake time, and try to avoid naps.  Dont watch TV or read in bed.  When you watch TV or read in bed, you associate the bed with wakefulness.   The bed is reserved for two things - sleep and hanky panky.  Drink caffeinated drinks with caution  The effects of caffeine may last for several hours after ingestion. Caffeine can fragment sleep, and cause difficulty initiating sleep. If you drink caffeine, use it only before noon.   Remember that soda and tea contain caffeine as well.     Avoid inappropriate substances that interfere with sleep  Cigarettes, alcohol, and over-the-counter medications may cause fragmented sleep.  Exercise regularly  Exercise before 2 pm every day. Exercise promotes continuous sleep.   Avoid rigorous exercise before bedtime. Rigorous exercise circulates endorphins into the body which may cause difficulty initiating sleep.   exercise and sleep  Have a quiet, comfortable bedroom  Set your bedroom thermostat at a comfortable temperature. Generally, a little cooler is better than a little warmer.   Turn off the TV and other extraneous noise that may disrupt sleep. Background white noise like a fan is OK.   If your pets awaken you, keep them outside the bedroom.   Your bedroom should be dark. Turn off bright lights.   Have a comfortable mattress.  If you are a clock watcher at night, hide the clock.    Have a comfortable pre-bedtime routine  A warm bath, shower   Meditation, or quiet time  Some who are struggling with sleep regularly find it helpful to print out these recommendations and read them regularly. If you accidentally miss some of recommendations, or have a bad night, do not fret. By following these sleep hygiene recommendations, you will help yourself  to get into a routine that promotes good sleep opportunities.

## 2021-06-26 ENCOUNTER — HEALTH MAINTENANCE LETTER (OUTPATIENT)
Age: 56
End: 2021-06-26

## 2021-06-27 NOTE — PROGRESS NOTES
Progress Notes by Feliberto Knight MD at 1/23/2019  4:00 PM     Author: Feliberto Knight MD Service: -- Author Type: Physician    Filed: 1/24/2019  5:48 AM Encounter Date: 1/23/2019 Status: Signed    : Feliberto Knight MD (Physician)       MALE PREVENTATIVE EXAM    Assessment and Plan:       Problem List Items Addressed This Visit     Anxiety Disorder NOS    Insomnia     New complaint today.   Comorbid anxiety and depression which are reasonably well controlled.  Early awakening insomnia with some rumination.  We discussed sleep hygiene as well as increased/improved nutrition as an initial response.  Patient was given a lengthy handout.  He should also consider a cognitive behavioral therapy-insomnia mino as a more direct intervention.           Class 2 severe obesity due to excess calories with serious comorbidity and body mass index (BMI) of 35.0 to 35.9 in adult (H)     The patient has gained weight since his most recent visit 13 months ago.  He and I had a lengthy conversation regarding strategies towards weight loss.  At this time, he is patient's as he struggled with side effects previously.  We discussed a framework of intermittent fasting, careful planning/prepping of meals, increase physical activity.  The patient was given a list of resources to help facilitate the above.  I have recommended that he return to clinic in 3 months for a follow-up appointment and that we consider (or reconsider) anorexigenic medications at that time.  He may be an excellent candidate for locaserin or liraglutide assuming he gets coverage.         Relevant Orders    Lipid Profile    Glycosylated Hemoglobin A1c    Vitamin D, Total (25-Hydroxy)    Metabolic syndrome     Fasting lab work will be completed in the next week to evaluate current status of this condition.  Anticipate worsening given his weight gain.  Suggests he may disproportionally benefit from liraglutide as well as an intermittent fasting framework.          Relevant Orders    Lipid Profile    Glycosylated Hemoglobin A1c    Hypertriglyceridemia    Relevant Orders    Lipid Profile    Glycosylated Hemoglobin A1c    Hypercholesteremia    Relevant Orders    Lipid Profile    Glycosylated Hemoglobin A1c    Snores     Worsening with weight gain.  Consider referral for sleep medicine.  Patient declines at this time.         Prostate nodule    Relevant Orders    PSA (Prostatic-Specific Antigen), Annual Screen    Routine general medical examination at a health care facility - Primary     This patient returns for an annual exam.  Is a new complaint of insomnia which is likely multifactorial and discussed elsewhere.  He and I had a lengthy conversation regarding overall health as it relates to his weight and a previous diagnosis of metabolic syndrome.  He will work to improve this condition.  We reviewed his family health history.  We have been checking a PSA over the past few years given his previous history of a prostate nodule which was found incidentally individual rectal exam and a routine exam in his 30s.  Anxiety/depression are reasonably well controlled and anticipate improvement with improved sleep.           Other Visit Diagnoses     Avitaminosis D        Relevant Orders    Vitamin D, Total (25-Hydroxy)        Next follow up:  Return in about 3 months (around 4/23/2019) for Weight loss follow-up?.    Immunization Review  Adult Imm Review: Missing doses of flu shot     Pt does not use tobacco products   I discussed the following with the patient:   Adult Healthy Living: Importance of regular exercise  Healthy nutrition  Weight loss referral options    I have had an Advance Directives discussion with the patient.    Subjective:   Chief Complaint: Jones Miller is an 53 y.o. male here for a preventative health visit.     HPI:  Early wakening insomnia.  Anxious thoughts.  Sometimes irrational.  Not every night but most nights.  Better recently since getting a new  "mattress.  \"I used to be the best sleeper.\"  He does snore but less recently.  He wonders if his anxiety has gone up    He has been eating better in the past couple of weeks.  Low carb.  Weight has been down 6 lbs.  He is working to avoid carbs.  Lots of veggies.  Concerned about fruit.      Healthy Habits  Are you taking a daily aspirin? No  Do you typically exercising at least 40 min, 3-4 times per week?  Yes  Do you usually eat at least 4 servings of fruit and vegetables a day, include whole grains and fiber and avoid regularly eating high fat foods? NO  Have you had an eye exam in the past two years? Yes  Do you see a dentist twice per year? NO   Do you have any concerns regarding sleep? YES    Safety Screen  If you own firearms, are they secured in a locked gun cabinet or with trigger locks? The patient does not own any firearms  Do you feel you are safe where you are living?: Yes (1/23/2019  4:13 PM)  Do you feel you are safe in your relationship(s)?: Yes (1/23/2019  4:13 PM)    Review of Systems:  Please see above.  The rest of the review of systems are negative for all systems.     Cancer Screening       Status Date      COLONOSCOPY Next Due 12/12/2019      Done 12/12/2014  COLONOSCOPY        Patient Care Team:  Feliberto Knight MD as PCP - General (Family Medicine)    History     Reviewed By Date/Time Sections Reviewed    Feliberto Knight MD 1/24/2019  5:45 AM Medical, Surgical, Tobacco, Alcohol, Drug Use, Sexual Activity, Family    Kayli Funes LPN 1/23/2019  4:12 PM Tobacco    Kayli Funes LPN 1/23/2019  4:10 PM Tobacco, Family    Kayli Funes LPN 1/23/2019  4:09 PM Surgical, Tobacco, Alcohol, Drug Use, Sexual Activity, Family            Objective:   Vital Signs:   Visit Vitals  /70 (Patient Site: Left Arm, Patient Position: Sitting, Cuff Size: Adult Large)   Pulse 72   Temp 98.1  F (36.7  C) (Oral)   Resp 20   Ht 6' 0.5\" (1.842 m) Comment: with shoes   Wt " (!) 265 lb (120.2 kg)   SpO2 98% Comment: room air   BMI 35.45 kg/m           PHYSICAL EXAM  Physical Exam   Constitutional: He is oriented to person, place, and time. He appears well-developed. No distress.   HENT:   Head: Normocephalic and atraumatic.   Right Ear: External ear normal.   Left Ear: External ear normal.   Nose: Nose normal.   Mouth/Throat: Oropharynx is clear and moist. No oropharyngeal exudate.   Eyes: Conjunctivae and EOM are normal. Pupils are equal, round, and reactive to light. Right eye exhibits no discharge. Left eye exhibits no discharge. No scleral icterus.   Neck: Normal range of motion. No thyromegaly present.   Cardiovascular: Normal rate, regular rhythm, normal heart sounds and intact distal pulses. Exam reveals no gallop and no friction rub.   No murmur heard.  Pulmonary/Chest: Effort normal and breath sounds normal. No respiratory distress. He has no wheezes.   Abdominal: Soft. He exhibits no distension and no mass. There is no tenderness.   Musculoskeletal: Normal range of motion. He exhibits no edema.   Lymphadenopathy:     He has no cervical adenopathy.   Neurological: He is alert and oriented to person, place, and time. He has normal reflexes. No cranial nerve deficit. He exhibits normal muscle tone.   Skin: Skin is warm.   Psychiatric: He has a normal mood and affect. His behavior is normal. Judgment and thought content normal.   Vitals reviewed.    The 10-year ASCVD risk score (Jacksboroshelby PAINTING Jr., et al., 2013) is: 5.2%    Values used to calculate the score:      Age: 53 years      Sex: Male      Is Non- : No      Diabetic: No      Tobacco smoker: No      Systolic Blood Pressure: 118 mmHg      Is BP treated: No      HDL Cholesterol: 45 mg/dL      Total Cholesterol: 212 mg/dL       Medication List           Accurate as of 1/23/19 11:59 PM. If you have any questions, ask your nurse or doctor.               STOP taking these medications    metFORMIN 500 MG 24 hr  tablet  Also known as:  GLUCOPHAGE XR  Stopped by:  Feliberto Knight MD            Additional Screenings Completed Today:

## 2021-07-03 NOTE — ADDENDUM NOTE
Addendum Note by Feliberto Mccauley MD at 12/13/2017  9:57 AM     Author: Feliberto Mccauley MD Service: -- Author Type: Physician    Filed: 12/13/2017  9:57 AM Encounter Date: 12/11/2017 Status: Signed    : Feliberto Mccauley MD (Physician)    Addended by: FELIBERTO MCCAULEY on: 12/13/2017 09:57 AM        Modules accepted: Orders

## 2021-08-16 ENCOUNTER — OFFICE VISIT (OUTPATIENT)
Dept: FAMILY MEDICINE | Facility: CLINIC | Age: 56
End: 2021-08-16
Payer: COMMERCIAL

## 2021-08-16 VITALS
HEART RATE: 68 BPM | DIASTOLIC BLOOD PRESSURE: 80 MMHG | SYSTOLIC BLOOD PRESSURE: 130 MMHG | WEIGHT: 261 LBS | BODY MASS INDEX: 35.35 KG/M2 | TEMPERATURE: 98.3 F | HEIGHT: 72 IN | OXYGEN SATURATION: 97 % | RESPIRATION RATE: 20 BRPM

## 2021-08-16 DIAGNOSIS — Z12.11 COLON CANCER SCREENING: ICD-10-CM

## 2021-08-16 DIAGNOSIS — F41.1 ANXIETY STATE: ICD-10-CM

## 2021-08-16 DIAGNOSIS — Z13.220 SCREENING FOR LIPID DISORDERS: ICD-10-CM

## 2021-08-16 DIAGNOSIS — Z12.5 SCREENING FOR PROSTATE CANCER: ICD-10-CM

## 2021-08-16 DIAGNOSIS — E66.812 CLASS 2 SEVERE OBESITY DUE TO EXCESS CALORIES WITH SERIOUS COMORBIDITY AND BODY MASS INDEX (BMI) OF 35.0 TO 35.9 IN ADULT (H): ICD-10-CM

## 2021-08-16 DIAGNOSIS — E66.01 CLASS 2 SEVERE OBESITY DUE TO EXCESS CALORIES WITH SERIOUS COMORBIDITY AND BODY MASS INDEX (BMI) OF 35.0 TO 35.9 IN ADULT (H): ICD-10-CM

## 2021-08-16 DIAGNOSIS — R06.83 SNORES: ICD-10-CM

## 2021-08-16 DIAGNOSIS — Z91.09 OTHER ALLERGY, OTHER THAN TO MEDICINAL AGENTS: ICD-10-CM

## 2021-08-16 DIAGNOSIS — G47.00 INSOMNIA, UNSPECIFIED TYPE: ICD-10-CM

## 2021-08-16 DIAGNOSIS — E88.810 METABOLIC SYNDROME: ICD-10-CM

## 2021-08-16 DIAGNOSIS — Z00.00 ROUTINE GENERAL MEDICAL EXAMINATION AT A HEALTH CARE FACILITY: Primary | ICD-10-CM

## 2021-08-16 LAB
ALT SERPL W P-5'-P-CCNC: 23 U/L (ref 0–45)
ANION GAP SERPL CALCULATED.3IONS-SCNC: 12 MMOL/L (ref 5–18)
BUN SERPL-MCNC: 13 MG/DL (ref 8–22)
CALCIUM SERPL-MCNC: 9.6 MG/DL (ref 8.5–10.5)
CHLORIDE BLD-SCNC: 106 MMOL/L (ref 98–107)
CHOLEST SERPL-MCNC: 214 MG/DL
CO2 SERPL-SCNC: 23 MMOL/L (ref 22–31)
CREAT SERPL-MCNC: 0.99 MG/DL (ref 0.7–1.3)
FASTING STATUS PATIENT QL REPORTED: YES
GFR SERPL CREATININE-BSD FRML MDRD: 85 ML/MIN/1.73M2
GLUCOSE BLD-MCNC: 83 MG/DL (ref 70–125)
HBA1C MFR BLD: 5.2 %
HDLC SERPL-MCNC: 48 MG/DL
LDLC SERPL CALC-MCNC: 146 MG/DL
POTASSIUM BLD-SCNC: 4.4 MMOL/L (ref 3.5–5)
PSA SERPL-MCNC: 0.96 UG/L (ref 0–3.5)
SODIUM SERPL-SCNC: 141 MMOL/L (ref 136–145)
TRIGL SERPL-MCNC: 99 MG/DL

## 2021-08-16 PROCEDURE — 80061 LIPID PANEL: CPT | Performed by: FAMILY MEDICINE

## 2021-08-16 PROCEDURE — 80048 BASIC METABOLIC PNL TOTAL CA: CPT | Performed by: FAMILY MEDICINE

## 2021-08-16 PROCEDURE — 83036 HEMOGLOBIN GLYCOSYLATED A1C: CPT | Performed by: FAMILY MEDICINE

## 2021-08-16 PROCEDURE — G0103 PSA SCREENING: HCPCS | Performed by: FAMILY MEDICINE

## 2021-08-16 PROCEDURE — 96127 BRIEF EMOTIONAL/BEHAV ASSMT: CPT | Mod: 59 | Performed by: FAMILY MEDICINE

## 2021-08-16 PROCEDURE — 84460 ALANINE AMINO (ALT) (SGPT): CPT | Performed by: FAMILY MEDICINE

## 2021-08-16 PROCEDURE — 99213 OFFICE O/P EST LOW 20 MIN: CPT | Mod: 25 | Performed by: FAMILY MEDICINE

## 2021-08-16 PROCEDURE — 99396 PREV VISIT EST AGE 40-64: CPT | Performed by: FAMILY MEDICINE

## 2021-08-16 PROCEDURE — 36415 COLL VENOUS BLD VENIPUNCTURE: CPT | Performed by: FAMILY MEDICINE

## 2021-08-16 ASSESSMENT — ENCOUNTER SYMPTOMS
HEADACHES: 0
ARTHRALGIAS: 0
ABDOMINAL PAIN: 0
JOINT SWELLING: 0
NERVOUS/ANXIOUS: 0
WEAKNESS: 0
DIARRHEA: 0
PARESTHESIAS: 0
FEVER: 0
FREQUENCY: 0
HEARTBURN: 0
SORE THROAT: 0
EYE PAIN: 0
SHORTNESS OF BREATH: 0
MYALGIAS: 0
HEMATURIA: 0
CONSTIPATION: 0
CHILLS: 0
HEMATOCHEZIA: 0
DYSURIA: 0
COUGH: 0
DIZZINESS: 0
NAUSEA: 0
PALPITATIONS: 0

## 2021-08-16 ASSESSMENT — MIFFLIN-ST. JEOR: SCORE: 2043.95

## 2021-08-16 ASSESSMENT — PATIENT HEALTH QUESTIONNAIRE - PHQ9: SUM OF ALL RESPONSES TO PHQ QUESTIONS 1-9: 5

## 2021-08-16 NOTE — PATIENT INSTRUCTIONS
The Case for Warren Jimenez  ____________________________     - Google: sleep hygiene (American Sleep Association)   - Try Melatonin  (5 mg 1-2 hours before bedtime)   - [facility]  ([facility])     Jaime Sexton, PhD   - Why We Sleep   - search for author name and podcast for a number of interviews    Sleep Hygiene Tips - Research & Treatments  Getting good sleep is important in maintaining health. There are several things that you can do to promote good sleep and sleep hygiene, and ultimately Get Better Sleep.  What is sleep hygiene?  Sleep hygiene is defined as behaviors that one can do to help promote good sleep using behavioral interventions.  Sleep hygiene tips:  Maintain a regular sleep routine    Go to bed at the same time. Wake up at the same time. Ideally, your schedule will remain the same (+/- 20 minutes) every night of the week.  Avoid naps if possible    Naps decrease the  Sleep Debt  that is so necessary for easy sleep onset.     Each of us needs a certain amount of sleep per 24-hour period. We need that amount, and we don t need more than that.     When we take naps, it decreases the amount of sleep that we need the next night - which may cause sleep fragmentation and difficulty initiating sleep, and may lead to insomnia.  Don t stay in bed awake for more than 5-10 minutes.    If you find your mind racing, or worrying about not being able to sleep during the middle of the night, get out of bed, and sit in a chair in the dark. Do your mind racing in the chair until you are sleepy, then return to bed. No TV or internet during these periods! That will just stimulate you more than desired.    If this happens several times during the night, that is OK. Just maintain your regular wake time, and try to avoid naps.  Don t watch TV or read in bed.    When you watch TV or read in bed, you associate the bed with wakefulness.     The bed is reserved for two  things - sleep and ana markham.  Drink caffeinated drinks with caution    The effects of caffeine may last for several hours after ingestion. Caffeine can fragment sleep, and cause difficulty initiating sleep. If you drink caffeine, use it only before noon.     Remember that soda and tea contain caffeine as well.       Avoid inappropriate substances that interfere with sleep    Cigarettes, alcohol, and over-the-counter medications may cause fragmented sleep.  Exercise regularly    Exercise before 2 pm every day. Exercise promotes continuous sleep.     Avoid rigorous exercise before bedtime. Rigorous exercise circulates endorphins into the body which may cause difficulty initiating sleep.   exercise and sleep  Have a quiet, comfortable bedroom    Set your bedroom thermostat at a comfortable temperature. Generally, a little cooler is better than a little warmer.     Turn off the TV and other extraneous noise that may disrupt sleep. Background  white noise  like a fan is OK.     If your pets awaken you, keep them outside the bedroom.     Your bedroom should be dark. Turn off bright lights.     Have a comfortable mattress.  If you are a  clock watcher  at night, hide the clock.  Have a comfortable pre-bedtime routine    A warm bath, shower     Meditation, or quiet time  Some who are struggling with sleep regularly find it helpful to print out these recommendations and read them regularly. If you accidentally miss some of recommendations, or have a bad night, do not fret. By following these sleep hygiene recommendations, you will help yourself to get into a routine that promotes good sleep opportunities.

## 2021-08-16 NOTE — ASSESSMENT & PLAN NOTE
Weight as discussed elsewhere.  Fasting lab work today.  Some early wakening insomnia.  Sleep hygiene discussed.  Low risk for HIV and hepatitis C.  Screening deferred.  He is due for a colonoscopy and referral was placed today.  He has received his COVID-19 vaccination.

## 2021-08-16 NOTE — ASSESSMENT & PLAN NOTE
This patient presents for annual exam but also has questions about medical weight loss.  He saw that semaglutide was approved and wonders if this medication might be helpful.  He tends to do well for periods of time on an extreme low carbohydrate/high fat framework but then has cravings (associated with stress) that leads him to no longer eat in that way with following weight gain.  There is no contraindication to trial of semaglutide.  This fits with an insulin resistant profile.  We discussed potential side effects of this medication.  -Semaglutide 0.25 mg sent to pharmacy to check for insurance coverage.  He also will likely meet criteria for prediabetes.  If medical weight loss is not covered we could try for treatment of prediabetes (if he meets criteria).  -Follow-up 4 weeks after start of new medication.

## 2021-08-16 NOTE — PROGRESS NOTES
"SUBJECTIVE:   CC: Jones Miller is an 55 year old male who presents for preventative health visit.     Patient has been advised of split billing requirements and indicates understanding: Yes  Healthy Habits:     Getting at least 3 servings of Calcium per day:  Yes    Bi-annual eye exam:  NO    Dental care twice a year:  NO    Sleep apnea or symptoms of sleep apnea:  Daytime drowsiness and Excessive snoring    Diet:  Carbohydrate counting    Frequency of exercise:  2-3 days/week    Duration of exercise:  45-60 minutes    Taking medications regularly:  Yes    Medication side effects:  Other    PHQ-2 Total Score: 2    Additional concerns today:  Yes    Weight\"   - on and off a diet.  He struggles to maintain his diet.  Weight fluctuates.  \"Two good runs\" of carbohydrate.  \"keto diet.\" Anxiety can trigger eating different foods.     Anxiety / depression / sleep:   - wakes up during the night.  Affects memory and mood.  \"anxious dreams.\" This has improved over the past few months.    - not interested in meds    - takes dogs for a walk    - he minimizes alcohol.     Today's PHQ-2 Score:   PHQ-2 (  [employer]) 2021   Q1: Little interest or pleasure in doing things 1   Q2: Feeling down, depressed or hopeless 1   PHQ-2 Score 2   Q1: Little interest or pleasure in doing things Several days   Q2: Feeling down, depressed or hopeless Several days   PHQ-2 Score 2     Abuse: Current or Past(Physical, Sexual or Emotional)- no   Do you feel safe in your environment? Yes    Social History     Tobacco Use     Smoking status: Former Smoker     Quit date: 1999     Years since quittin.5     Smokeless tobacco: Never Used   Substance Use Topics     Alcohol use: Yes     Comment: Alcoholic Drinks/day: 3-4 drinks per week      Alcohol Use 2021   Prescreen: >3 drinks/day or >7 drinks/week? Yes   AUDIT SCORE  8     Last PSA:   Prostate Specific Antigen Screen   Date Value Ref Range Status   2019 0.9 0.00 - 3.50 ng/mL " "Final       Reviewed orders with patient. Reviewed health maintenance and updated orders accordingly - Yes    Reviewed and updated as needed this visit by clinical staff  Tobacco  Allergies  Meds  Problems  Med Hx  Surg Hx  Fam Hx  Soc Hx          Reviewed and updated as needed this visit by Provider  Tobacco  Allergies   Problems  Med Hx  Surg Hx   Soc Hx         Review of Systems   Constitutional: Negative for chills and fever.   HENT: Negative for congestion, ear pain, hearing loss and sore throat.    Eyes: Negative for pain and visual disturbance.   Respiratory: Negative for cough and shortness of breath.    Cardiovascular: Negative for chest pain, palpitations and peripheral edema.   Gastrointestinal: Negative for abdominal pain, constipation, diarrhea, heartburn, hematochezia and nausea.   Genitourinary: Negative for discharge, dysuria, frequency, genital sores, hematuria, impotence and urgency.   Musculoskeletal: Negative for arthralgias, joint swelling and myalgias.   Skin: Negative for rash.   Neurological: Negative for dizziness, weakness, headaches and paresthesias.   Psychiatric/Behavioral: Negative for mood changes. The patient is not nervous/anxious.        OBJECTIVE:   /80 (BP Location: Left arm, Patient Position: Chair, Cuff Size: Adult Large)   Pulse 68   Temp 98.3  F (36.8  C) (Oral)   Resp 20   Ht 1.816 m (5' 11.5\")   Wt 118.4 kg (261 lb)   SpO2 97%   BMI 35.89 kg/m      Physical Exam  Vitals reviewed.   Constitutional:       General: He is not in acute distress.     Appearance: Normal appearance.   HENT:      Head: Normocephalic and atraumatic.      Right Ear: External ear normal.      Left Ear: External ear normal.      Nose: Nose normal.      Mouth/Throat:      Pharynx: No oropharyngeal exudate or posterior oropharyngeal erythema.   Eyes:      General: No scleral icterus.  Cardiovascular:      Rate and Rhythm: Normal rate and regular rhythm.      Heart sounds: Normal " heart sounds. No murmur heard.   No friction rub. No gallop.    Pulmonary:      Effort: Pulmonary effort is normal. No respiratory distress.      Breath sounds: No wheezing.   Abdominal:      General: Bowel sounds are normal. There is no distension.      Palpations: Abdomen is soft. There is no mass.      Tenderness: There is no abdominal tenderness.   Musculoskeletal:         General: No swelling. Normal range of motion.      Cervical back: Normal range of motion.   Skin:     Findings: No rash.   Neurological:      General: No focal deficit present.      Mental Status: He is alert and oriented to person, place, and time.      Cranial Nerves: No cranial nerve deficit.      Deep Tendon Reflexes: Reflexes normal.   Psychiatric:         Mood and Affect: Mood normal.         Behavior: Behavior normal.         Thought Content: Thought content normal.         Judgment: Judgment normal.     Diagnostic Test Results:  Labs reviewed in Epic    ASSESSMENT/PLAN:     Routine general medical examination at a health care facility  Weight as discussed elsewhere.  Fasting lab work today.  Some early wakening insomnia.  Sleep hygiene discussed.  Low risk for HIV and hepatitis C.  Screening deferred.  He is due for a colonoscopy and referral was placed today.  He has received his COVID-19 vaccination.    Class 2 severe obesity due to excess calories with serious comorbidity and body mass index (BMI) of 35.0 to 35.9 in adult (H)  This patient presents for annual exam but also has questions about medical weight loss.  He saw that semaglutide was approved and wonders if this medication might be helpful.  He tends to do well for periods of time on an extreme low carbohydrate/high fat framework but then has cravings (associated with stress) that leads him to no longer eat in that way with following weight gain.  There is no contraindication to trial of semaglutide.  This fits with an insulin resistant profile.  We discussed potential side  "effects of this medication.  -Semaglutide 0.25 mg sent to pharmacy to check for insurance coverage.  He also will likely meet criteria for prediabetes.  If medical weight loss is not covered we could try for treatment of prediabetes (if he meets criteria).  -Follow-up 4 weeks after start of new medication.    Patient has been advised of split billing requirements and indicates understanding: Yes  COUNSELING:   Reviewed preventive health counseling, as reflected in patient instructions       Regular exercise       Healthy diet/nutrition       Alcohol Use        Colon cancer screening       Prostate cancer screening    Estimated body mass index is 35.89 kg/m  as calculated from the following:    Height as of this encounter: 1.816 m (5' 11.5\").    Weight as of this encounter: 118.4 kg (261 lb).     Weight management plan: Discussed healthy diet and exercise guidelines    He reports that he quit smoking about 22 years ago. He has never used smokeless tobacco.    Counseling Resources:  ATP IV Guidelines  Pooled Cohorts Equation Calculator  FRAX Risk Assessment  ICSI Preventive Guidelines  Dietary Guidelines for Americans, 2010  USDA's MyPlate  ASA Prophylaxis  Lung CA Screening    Feliberto Knight MD  [facility]  "

## 2021-10-16 ENCOUNTER — HEALTH MAINTENANCE LETTER (OUTPATIENT)
Age: 56
End: 2021-10-16

## 2022-06-02 ENCOUNTER — IMMUNIZATION (OUTPATIENT)
Dept: NURSING | Facility: CLINIC | Age: 57
End: 2022-06-02
Payer: COMMERCIAL

## 2022-06-02 PROCEDURE — 91306 COVID-19,PF,MODERNA (18+ YRS BOOSTER .25ML): CPT

## 2022-06-02 PROCEDURE — 0064A COVID-19,PF,MODERNA (18+ YRS BOOSTER .25ML): CPT

## 2022-06-07 ENCOUNTER — TRANSFERRED RECORDS (OUTPATIENT)
Dept: HEALTH INFORMATION MANAGEMENT | Facility: CLINIC | Age: 57
End: 2022-06-07
Payer: COMMERCIAL

## 2022-06-15 ENCOUNTER — TRANSFERRED RECORDS (OUTPATIENT)
Dept: HEALTH INFORMATION MANAGEMENT | Facility: CLINIC | Age: 57
End: 2022-06-15
Payer: COMMERCIAL

## 2022-06-22 ENCOUNTER — TRANSCRIBE ORDERS (OUTPATIENT)
Dept: OTHER | Age: 57
End: 2022-06-22

## 2022-06-22 ENCOUNTER — PATIENT OUTREACH (OUTPATIENT)
Dept: ONCOLOGY | Facility: CLINIC | Age: 57
End: 2022-06-22

## 2022-06-22 DIAGNOSIS — D12.6 SERRATED POLYPOSIS SYNDROME: Primary | ICD-10-CM

## 2022-06-22 PROBLEM — D13.91 POLYPOSIS COLI: Status: ACTIVE | Noted: 2022-06-22

## 2022-06-24 PROBLEM — D12.6 SERRATED POLYPOSIS SYNDROME: Status: ACTIVE | Noted: 2022-06-22

## 2022-09-06 ENCOUNTER — OFFICE VISIT (OUTPATIENT)
Dept: FAMILY MEDICINE | Facility: CLINIC | Age: 57
End: 2022-09-06
Payer: COMMERCIAL

## 2022-09-06 VITALS
HEART RATE: 75 BPM | SYSTOLIC BLOOD PRESSURE: 122 MMHG | HEIGHT: 72 IN | WEIGHT: 263.7 LBS | OXYGEN SATURATION: 97 % | TEMPERATURE: 98.6 F | BODY MASS INDEX: 35.72 KG/M2 | RESPIRATION RATE: 18 BRPM | DIASTOLIC BLOOD PRESSURE: 68 MMHG

## 2022-09-06 DIAGNOSIS — Z12.5 SCREENING FOR PROSTATE CANCER: ICD-10-CM

## 2022-09-06 DIAGNOSIS — Z00.00 ROUTINE GENERAL MEDICAL EXAMINATION AT A HEALTH CARE FACILITY: Primary | ICD-10-CM

## 2022-09-06 DIAGNOSIS — E55.9 AVITAMINOSIS D: ICD-10-CM

## 2022-09-06 DIAGNOSIS — D12.6 SERRATED POLYPOSIS SYNDROME: ICD-10-CM

## 2022-09-06 DIAGNOSIS — E88.810 METABOLIC SYNDROME: ICD-10-CM

## 2022-09-06 DIAGNOSIS — E78.00 HYPERCHOLESTEREMIA: ICD-10-CM

## 2022-09-06 DIAGNOSIS — E66.01 CLASS 2 SEVERE OBESITY DUE TO EXCESS CALORIES WITH SERIOUS COMORBIDITY AND BODY MASS INDEX (BMI) OF 36.0 TO 36.9 IN ADULT (H): ICD-10-CM

## 2022-09-06 DIAGNOSIS — R53.83 FATIGUE, UNSPECIFIED TYPE: ICD-10-CM

## 2022-09-06 DIAGNOSIS — E66.812 CLASS 2 SEVERE OBESITY DUE TO EXCESS CALORIES WITH SERIOUS COMORBIDITY AND BODY MASS INDEX (BMI) OF 36.0 TO 36.9 IN ADULT (H): ICD-10-CM

## 2022-09-06 DIAGNOSIS — E78.1 HYPERTRIGLYCERIDEMIA: ICD-10-CM

## 2022-09-06 LAB
ALBUMIN SERPL BCG-MCNC: 4.6 G/DL (ref 3.5–5.2)
ALP SERPL-CCNC: 67 U/L (ref 40–129)
ALT SERPL W P-5'-P-CCNC: 25 U/L (ref 10–50)
ANION GAP SERPL CALCULATED.3IONS-SCNC: 10 MMOL/L (ref 7–15)
AST SERPL W P-5'-P-CCNC: 27 U/L (ref 10–50)
BILIRUB SERPL-MCNC: 0.5 MG/DL
BUN SERPL-MCNC: 11.2 MG/DL (ref 6–20)
CALCIUM SERPL-MCNC: 9.3 MG/DL (ref 8.6–10)
CHLORIDE SERPL-SCNC: 105 MMOL/L (ref 98–107)
CHOLEST SERPL-MCNC: 216 MG/DL
CREAT SERPL-MCNC: 0.94 MG/DL (ref 0.67–1.17)
DEPRECATED HCO3 PLAS-SCNC: 24 MMOL/L (ref 22–29)
ERYTHROCYTE [DISTWIDTH] IN BLOOD BY AUTOMATED COUNT: 13.5 % (ref 10–15)
GFR SERPL CREATININE-BSD FRML MDRD: >90 ML/MIN/1.73M2
GLUCOSE SERPL-MCNC: 99 MG/DL (ref 70–99)
HCT VFR BLD AUTO: 42.2 % (ref 40–53)
HDLC SERPL-MCNC: 46 MG/DL
HGB BLD-MCNC: 14.2 G/DL (ref 13.3–17.7)
LDLC SERPL CALC-MCNC: 145 MG/DL
MCH RBC QN AUTO: 29.8 PG (ref 26.5–33)
MCHC RBC AUTO-ENTMCNC: 33.6 G/DL (ref 31.5–36.5)
MCV RBC AUTO: 89 FL (ref 78–100)
NONHDLC SERPL-MCNC: 170 MG/DL
PLATELET # BLD AUTO: 221 10E3/UL (ref 150–450)
POTASSIUM SERPL-SCNC: 4.3 MMOL/L (ref 3.4–5.3)
PROT SERPL-MCNC: 7.4 G/DL (ref 6.4–8.3)
PSA SERPL-MCNC: 1.01 NG/ML (ref 0–3.5)
RBC # BLD AUTO: 4.76 10E6/UL (ref 4.4–5.9)
SODIUM SERPL-SCNC: 139 MMOL/L (ref 136–145)
TRIGL SERPL-MCNC: 127 MG/DL
TSH SERPL DL<=0.005 MIU/L-ACNC: 2.3 UIU/ML (ref 0.3–4.2)
WBC # BLD AUTO: 4.2 10E3/UL (ref 4–11)

## 2022-09-06 PROCEDURE — 36415 COLL VENOUS BLD VENIPUNCTURE: CPT | Performed by: FAMILY MEDICINE

## 2022-09-06 PROCEDURE — 80061 LIPID PANEL: CPT | Performed by: FAMILY MEDICINE

## 2022-09-06 PROCEDURE — 99214 OFFICE O/P EST MOD 30 MIN: CPT | Mod: 25 | Performed by: FAMILY MEDICINE

## 2022-09-06 PROCEDURE — 80053 COMPREHEN METABOLIC PANEL: CPT | Performed by: FAMILY MEDICINE

## 2022-09-06 PROCEDURE — 99396 PREV VISIT EST AGE 40-64: CPT | Mod: 25 | Performed by: FAMILY MEDICINE

## 2022-09-06 PROCEDURE — G0103 PSA SCREENING: HCPCS | Performed by: FAMILY MEDICINE

## 2022-09-06 PROCEDURE — 90715 TDAP VACCINE 7 YRS/> IM: CPT | Performed by: FAMILY MEDICINE

## 2022-09-06 PROCEDURE — 90471 IMMUNIZATION ADMIN: CPT | Performed by: FAMILY MEDICINE

## 2022-09-06 PROCEDURE — 84443 ASSAY THYROID STIM HORMONE: CPT | Performed by: FAMILY MEDICINE

## 2022-09-06 PROCEDURE — 82306 VITAMIN D 25 HYDROXY: CPT | Performed by: FAMILY MEDICINE

## 2022-09-06 PROCEDURE — 85027 COMPLETE CBC AUTOMATED: CPT | Performed by: FAMILY MEDICINE

## 2022-09-06 RX ORDER — TIRZEPATIDE 2.5 MG/.5ML
0.25 INJECTION, SOLUTION SUBCUTANEOUS WEEKLY
Qty: 2 ML | Refills: 0 | Status: SHIPPED | OUTPATIENT
Start: 2022-09-06 | End: 2022-09-08

## 2022-09-06 ASSESSMENT — ENCOUNTER SYMPTOMS
MYALGIAS: 0
WEAKNESS: 1
HEARTBURN: 0
FEVER: 0
EYE PAIN: 0
ARTHRALGIAS: 1
HEMATURIA: 0
PARESTHESIAS: 0
CONSTIPATION: 0
SHORTNESS OF BREATH: 0
DYSURIA: 0
SORE THROAT: 0
FREQUENCY: 0
PALPITATIONS: 0
DIZZINESS: 0
JOINT SWELLING: 0
DIARRHEA: 0
COUGH: 0
HEMATOCHEZIA: 0
NAUSEA: 0
CHILLS: 0
NERVOUS/ANXIOUS: 0
HEADACHES: 0
ABDOMINAL PAIN: 0

## 2022-09-06 ASSESSMENT — PATIENT HEALTH QUESTIONNAIRE - PHQ9
SUM OF ALL RESPONSES TO PHQ QUESTIONS 1-9: 8
SUM OF ALL RESPONSES TO PHQ QUESTIONS 1-9: 8
10. IF YOU CHECKED OFF ANY PROBLEMS, HOW DIFFICULT HAVE THESE PROBLEMS MADE IT FOR YOU TO DO YOUR WORK, TAKE CARE OF THINGS AT HOME, OR GET ALONG WITH OTHER PEOPLE: SOMEWHAT DIFFICULT

## 2022-09-06 NOTE — ASSESSMENT & PLAN NOTE
Discussed in the context of weight.  I suspect that his episodes of lightheadedness are actually symptoms of low blood sugars or blood sugar fluctuation.  Most recent was 4-5 months ago.  Given how much time it has been since his last set of symptoms, no additional testing was ordered today but he will continue to observe.  We will add a pharmaceutical to help with blood sugar regulation and appetite (and hopefully improve metabolic health/weight).

## 2022-09-06 NOTE — ASSESSMENT & PLAN NOTE
Weight increased slightly in comparison to most recent measurement.  He tried a number of different pharmaceuticals and nutrition plan.  He never did start semaglutide as it was not covered by his insurance and was unavailable.  No contraindication to GLP-1 receptor agonist.  History of insulin resistance.  - Trial of Mounjaro used off label.  No contraindication.  Discussed utilization of the manufactures coupon from their website.  He understands that this medicine would likely be difficult to get coverage for in 12 months time.

## 2022-09-06 NOTE — ASSESSMENT & PLAN NOTE
Annual exam.  Obesity/metabolic health as discussed elsewhere.  Fasting lab work will be completed today.  Reviewed shingles vaccination.  He has a lesion on his right forearm proximately 3-4 mm in diameter which is flat.  Likely result of injury while clearing brush but if persistent will plan for tissue biopsy (punch?).  We will focus on metabolic health in the next 12 months.  Tirzepatide prescribed.  Follow-up 6 weeks after starting new medication.

## 2022-09-06 NOTE — PROGRESS NOTES
"SUBJECTIVE:   CC: Jones Miller is an 56 year old male who presents for preventative health visit.     Chief Complaint   Patient presents with     Physical     Other health concerns:   - three episodes of fainting, weakness lightheadedness.  He had been standing in his kitchen for the first episode.  He then had an episode while seated.  Lastly,  He felt lightheadedness while walking.  Most recent was in May.  No associated chest pain. He does not recall that he had nutrition change around that time.    - energy has been poor.  Exhausted.     - nutrition: he experimented with carb restriction.  Now on a gluten free diet.     - \"I am trying everything.\"    - he has some days when his energy is better.    - sleep recently has continued to be fragmented. He wakes early.  Trying to do more meditation.      Patient has been advised of split billing requirements and indicates understanding: Yes  Healthy Habits:     Getting at least 3 servings of Calcium per day:  Yes    Bi-annual eye exam:  NO    Dental care twice a year:  NO    Sleep apnea or symptoms of sleep apnea:  Daytime drowsiness and Excessive snoring    Diet:  Carbohydrate counting, Vegetarian/vegan, Gluten-free/reduced and Breakfast skipped    Frequency of exercise:  2-3 days/week    Duration of exercise:  30-45 minutes    Taking medications regularly:  Yes    PHQ-2 Total Score: 2    Additional concerns today:  No      Today's PHQ-2 Score:   PHQ-2 ( 1999 [employer]) 9/6/2022   Q1: Little interest or pleasure in doing things 1   Q2: Feeling down, depressed or hopeless 1   PHQ-2 Score 2   PHQ-2 Total Score (12-17 Years)- Positive if 3 or more points; Administer PHQ-A if positive -   Q1: Little interest or pleasure in doing things Several days   Q2: Feeling down, depressed or hopeless Several days   PHQ-2 Score 2       Abuse: Current or Past(Physical, Sexual or Emotional)- No  Do you feel safe in your environment? Yes        Social History     Tobacco Use     Smoking " status: Former Smoker     Quit date: 1999     Years since quittin.5     Smokeless tobacco: Never Used   Substance Use Topics     Alcohol use: Yes     Comment: Alcoholic Drinks/day: 3-4 drinks per week      If you drink alcohol do you typically have >3 drinks per day or >7 drinks per week? Yes    Alcohol Use 2022   Prescreen: >3 drinks/day or >7 drinks/week? Yes   Prescreen: >3 drinks/day or >7 drinks/week? -   AUDIT SCORE  8     AUDIT - Alcohol Use Disorders Identification Test - Reproduced from the World Health Organization Audit 2001 (Second Edition) 2022   1.  How often do you have a drink containing alcohol? 2 to 3 times a week   2.  How many drinks containing alcohol do you have on a typical day when you are drinking? 3 or 4   3.  How often do you have five or more drinks on one occasion? Less than monthly   4.  How often during the last year have you found that you were not able to stop drinking once you had started? Less than monthly   5.  How often during the last year have you failed to do what was normally expected of you because of drinking? Less than monthly   6.  How often during the last year have you needed a first drink in the morning to get yourself going after a heavy drinking session? Never   7.  How often during the last year have you had a feeling of guilt or remorse after drinking? Less than monthly   8.  How often during the last year have you been unable to remember what happened the night before because of your drinking? Never   9.  Have you or someone else been injured because of your drinking? No   10. Has a relative, friend, doctor or other health care worker been concerned about your drinking or suggested you cut down? No   TOTAL SCORE 8       Last PSA:   Prostate Specific Antigen Screen   Date Value Ref Range Status   2021 0.96 0.00 - 3.50 ug/L Final       Reviewed orders with patient. Reviewed health maintenance and updated orders accordingly - Yes      Reviewed  "and updated as needed this visit by clinical staff   Tobacco  Allergies  Meds                Reviewed and updated as needed this visit by Provider                       Review of Systems   Constitutional: Negative for chills and fever.   HENT: Positive for hearing loss. Negative for congestion, ear pain and sore throat.    Eyes: Negative for pain and visual disturbance.   Respiratory: Negative for cough and shortness of breath.    Cardiovascular: Negative for chest pain, palpitations and peripheral edema.   Gastrointestinal: Negative for abdominal pain, constipation, diarrhea, heartburn, hematochezia and nausea.   Genitourinary: Negative for dysuria, frequency, genital sores, hematuria, impotence, penile discharge and urgency.   Musculoskeletal: Positive for arthralgias. Negative for joint swelling and myalgias.   Skin: Negative for rash.   Neurological: Positive for weakness. Negative for dizziness, headaches and paresthesias.   Psychiatric/Behavioral: Negative for mood changes. The patient is not nervous/anxious.        OBJECTIVE:   /68 (BP Location: Left arm, Patient Position: Sitting, Cuff Size: Adult Large)   Pulse 75   Temp 98.6  F (37  C) (Oral)   Resp 18   Ht 1.822 m (5' 11.75\")   Wt 119.6 kg (263 lb 11.2 oz)   SpO2 97%   BMI 36.01 kg/m      Physical Exam  Vitals reviewed.   Constitutional:       General: He is not in acute distress.     Appearance: Normal appearance. He is not ill-appearing.   HENT:      Head: Normocephalic and atraumatic.      Right Ear: External ear normal.      Left Ear: External ear normal.      Nose: Nose normal.      Mouth/Throat:      Pharynx: Oropharynx is clear. No oropharyngeal exudate or posterior oropharyngeal erythema.   Eyes:      General: No scleral icterus.        Right eye: No discharge.         Left eye: No discharge.      Extraocular Movements: Extraocular movements intact.      Conjunctiva/sclera: Conjunctivae normal.      Pupils: Pupils are equal, round, " and reactive to light.   Neck:      Comments: No thyromegaly.  Cardiovascular:      Rate and Rhythm: Normal rate and regular rhythm.      Heart sounds: Normal heart sounds. No murmur heard.    No friction rub. No gallop.   Pulmonary:      Effort: Pulmonary effort is normal. No respiratory distress.      Breath sounds: Normal breath sounds. No wheezing or rales.   Abdominal:      General: There is no distension.      Palpations: Abdomen is soft. There is no mass.      Tenderness: There is no abdominal tenderness.   Musculoskeletal:         General: No signs of injury. Normal range of motion.      Cervical back: Normal range of motion.      Right lower leg: No edema.      Left lower leg: No edema.   Lymphadenopathy:      Cervical: No cervical adenopathy.   Skin:     General: Skin is warm.      Coloration: Skin is not jaundiced.      Findings: No rash.   Neurological:      General: No focal deficit present.      Mental Status: He is alert and oriented to person, place, and time.      Cranial Nerves: No cranial nerve deficit.      Deep Tendon Reflexes: Reflexes normal.   Psychiatric:         Mood and Affect: Mood normal.         Diagnostic Test Results:  Labs reviewed in Epic    ASSESSMENT/PLAN:     Problem List Items Addressed This Visit     Avitaminosis D    Relevant Orders    Vitamin D Deficiency    Class 2 severe obesity due to excess calories with serious comorbidity and body mass index (BMI) of 36.0 to 36.9 in adult (H)     Weight increased slightly in comparison to most recent measurement.  He tried a number of different pharmaceuticals and nutrition plan.  He never did start semaglutide as it was not covered by his insurance and was unavailable.  No contraindication to GLP-1 receptor agonist.  History of insulin resistance.  - Trial of Mounjaro used off label.  No contraindication.  Discussed utilization of the manufactures coupon from their website.  He understands that this medicine would likely be difficult  to get coverage for in 12 months time.           Relevant Medications    Tirzepatide (MOUNJARO) 2.5 MG/0.5ML SOPN    Hypercholesteremia    Relevant Orders    Comprehensive metabolic panel (BMP + Alb, Alk Phos, ALT, AST, Total. Bili, TP)    Lipid Profile (Chol, Trig, HDL, LDL calc)    Hypertriglyceridemia    Relevant Orders    Comprehensive metabolic panel (BMP + Alb, Alk Phos, ALT, AST, Total. Bili, TP)    Metabolic syndrome     Discussed in the context of weight.  I suspect that his episodes of lightheadedness are actually symptoms of low blood sugars or blood sugar fluctuation.  Most recent was 4-5 months ago.  Given how much time it has been since his last set of symptoms, no additional testing was ordered today but he will continue to observe.  We will add a pharmaceutical to help with blood sugar regulation and appetite (and hopefully improve metabolic health/weight).           Relevant Medications    Tirzepatide (MOUNJARO) 2.5 MG/0.5ML SOPN    Routine general medical examination at a health care facility - Primary     Annual exam.  Obesity/metabolic health as discussed elsewhere.  Fasting lab work will be completed today.  Reviewed shingles vaccination.  He has a lesion on his right forearm proximately 3-4 mm in diameter which is flat.  Likely result of injury while clearing brush but if persistent will plan for tissue biopsy (punch?).  We will focus on metabolic health in the next 12 months.  Tirzepatide prescribed.  Follow-up 6 weeks after starting new medication.           Serrated polyposis syndrome     Annual colonoscopy.             Other Visit Diagnoses     Fatigue, unspecified type        Relevant Orders    CBC with platelets (Completed)    TSH with free T4 reflex    Vitamin D Deficiency    Screening for prostate cancer        Relevant Orders    PSA, screen          Patient has been advised of split billing requirements and indicates understanding: Yes    COUNSELING:   Reviewed preventive health  "counseling, as reflected in patient instructions       Regular exercise       Healthy diet/nutrition    Estimated body mass index is 36.01 kg/m  as calculated from the following:    Height as of this encounter: 1.822 m (5' 11.75\").    Weight as of this encounter: 119.6 kg (263 lb 11.2 oz).     Weight management plan: Discussed healthy diet and exercise guidelines    He reports that he quit smoking about 23 years ago. He has never used smokeless tobacco.      Counseling Resources:  ATP IV Guidelines  Pooled Cohorts Equation Calculator  FRAX Risk Assessment  ICSI Preventive Guidelines  Dietary Guidelines for Americans, 2010  USDA's MyPlate  ASA Prophylaxis  Lung CA Screening    Feliberto Knight MD  [facility]  Answers for HPI/ROS submitted by the patient on 9/6/2022  If you checked off any problems, how difficult have these problems made it for you to do your work, take care of things at home, or get along with other people?: Somewhat difficult  PHQ9 TOTAL SCORE: 8      "

## 2022-09-07 LAB — DEPRECATED CALCIDIOL+CALCIFEROL SERPL-MC: 20 UG/L (ref 20–75)

## 2022-09-08 ENCOUNTER — TELEPHONE (OUTPATIENT)
Dept: FAMILY MEDICINE | Facility: CLINIC | Age: 57
End: 2022-09-08

## 2022-09-08 DIAGNOSIS — E88.810 METABOLIC SYNDROME: ICD-10-CM

## 2022-09-08 RX ORDER — TIRZEPATIDE 2.5 MG/.5ML
2.5 INJECTION, SOLUTION SUBCUTANEOUS WEEKLY
Qty: 2 ML | Refills: 0 | Status: SHIPPED | OUTPATIENT
Start: 2022-09-08 | End: 2022-10-18

## 2022-09-08 NOTE — TELEPHONE ENCOUNTER
Hello,     [facility] in [place] received an rx for Mounjaro 2.5mg/0.5ml injection. The directions state to inject 0.25mg once a week.   This medication should be dosed at 2.5mg once a week for starting dose.     If you agree, can you please send a new rx to the pharmacy. It's possible that a new PA will be needed, but we will let your office know if this is the case once we process the new rx through the insurance.     Thank you!  Nathalie Tena, Pharm.D.  [place] Pharmacist, [facility]   [facility]

## 2022-09-25 ENCOUNTER — HEALTH MAINTENANCE LETTER (OUTPATIENT)
Age: 57
End: 2022-09-25

## 2022-10-04 ENCOUNTER — MYC REFILL (OUTPATIENT)
Dept: FAMILY MEDICINE | Facility: CLINIC | Age: 57
End: 2022-10-04

## 2022-10-04 ENCOUNTER — TELEPHONE (OUTPATIENT)
Dept: FAMILY MEDICINE | Facility: CLINIC | Age: 57
End: 2022-10-04

## 2022-10-04 DIAGNOSIS — E88.810 METABOLIC SYNDROME: ICD-10-CM

## 2022-10-04 RX ORDER — TIRZEPATIDE 7.5 MG/.5ML
7.5 INJECTION, SOLUTION SUBCUTANEOUS WEEKLY
Qty: 2 ML | Refills: 0 | Status: SHIPPED | OUTPATIENT
Start: 2022-10-04 | End: 2022-10-26

## 2022-10-04 RX ORDER — TIRZEPATIDE 5 MG/.5ML
5 INJECTION, SOLUTION SUBCUTANEOUS WEEKLY
Qty: 2 ML | Refills: 0 | Status: SHIPPED | OUTPATIENT
Start: 2022-10-04 | End: 2022-10-26

## 2022-10-04 RX ORDER — TIRZEPATIDE 2.5 MG/.5ML
2.5 INJECTION, SOLUTION SUBCUTANEOUS WEEKLY
Qty: 2 ML | Refills: 0 | Status: CANCELLED | OUTPATIENT
Start: 2022-10-04

## 2022-10-04 NOTE — TELEPHONE ENCOUNTER
Prior Authorization Retail Medication Request    Medication/Dose: Mounjaro 5mg/0.5ml non formulary.   ICD code (if different than what is on RX):  NA  Previously Tried and Failed:  NA  Rationale:  NA    Insurance Name:  [facility]  Insurance ID:  [id number]       Pharmacy Information (if different than what is on RX)  Name:  [facility]  Phone:  [phone]

## 2022-10-05 NOTE — TELEPHONE ENCOUNTER
Central Prior Authorization Team   Phone: [phone]    PA Initiation    Medication: Mounjaro 5mg/0.5ml non formulary.   Insurance Company: [employer] - Phone [phone] Fax [phone]  Pharmacy Filling the Rx: [place], [place] - [address]  Filling Pharmacy Phone: [phone]  Filling Pharmacy Fax:    Start Date: 10/5/2022

## 2022-10-07 NOTE — TELEPHONE ENCOUNTER
The patient is currently taking this medicine using the [employer] coupon.  Is there a way to stop the prior authorization process going forward as it is going continue to fail for the same rationale?

## 2022-10-07 NOTE — TELEPHONE ENCOUNTER
PRIOR AUTHORIZATION DENIED    Medication: Mounjaro 5mg/0.5ml non formulary.     Denial Date: 10/7/2022    Denial Rational: Medication is excluded

## 2022-10-11 ENCOUNTER — VIRTUAL VISIT (OUTPATIENT)
Dept: ONCOLOGY | Facility: CLINIC | Age: 57
End: 2022-10-11
Attending: INTERNAL MEDICINE
Payer: COMMERCIAL

## 2022-10-11 DIAGNOSIS — Z84.81 FAMILY HISTORY OF BRCA1 GENE POSITIVE: Primary | ICD-10-CM

## 2022-10-11 DIAGNOSIS — Z80.3 FAMILY HISTORY OF MALIGNANT NEOPLASM OF BREAST: ICD-10-CM

## 2022-10-11 DIAGNOSIS — Z80.41 FAMILY HISTORY OF OVARIAN CANCER: ICD-10-CM

## 2022-10-11 DIAGNOSIS — D12.6 SERRATED POLYPOSIS SYNDROME: ICD-10-CM

## 2022-10-11 PROCEDURE — 96040 HC GENETIC COUNSELING, EACH 30 MINUTES: CPT | Mod: GT,95 | Performed by: GENETIC COUNSELOR, MS

## 2022-10-11 NOTE — PATIENT INSTRUCTIONS
Assessing Cancer Risk  Only about 5-10% of cancers are thought to be due to an inherited cancer susceptibility gene.    These families often have:  Several people with the same or related types of cancer  Cancers diagnosed at a young age (before age 50)  Individuals with more than one primary cancer  Multiple generations of the family affected with cancer    BRCA1 and BRCA2 Genes  We each inherit two copies of every gene in our bodies: one from our mother, and one from our father.  Each gene has a specific job to do.  When a gene has a mistake or  mutation  in it, it does not work like it should.  Everyone has two copies of BRCA1 and two copies of BRCA2.  A single mutation in one of the copies of BRCA1 or BRCA2 increases the risk for breast and ovarian cancer, among others.  The risk for pancreatic cancer and melanoma may also be slightly increased in some families.  The tables below list the chance that someone with a BRCA mutation would develop cancer in his or her lifetime1,2,3,4.      Women   Men    General Population BRCA +   General Population BRCA +   Breast 12% 40-85%  Breast <1% ~7%   Ovarian 1-2% 10-40%  Prostate 16% 20%     Inheriting a mutation does not mean a person will develop cancer, but it does significantly increase his or her risk above the general population risk.    A person s ethnic background is also important to consider, as individuals of Ashkenazi [place] ancestry have a higher chance of having a BRCA gene mutation.  There are three BRCA mutations that occur more frequently in this population.     Genetic Testing  Genetic testing involves a simple blood test and will look at the genetic information in the BRCA1 and BRCA2 genes for any harmful mutations that are associated with increased cancer risk.  If possible, it is recommended that the person(s) who has had cancer be tested before other family members.  That person will give us the most useful information about whether or not a  specific gene is associated with the cancer in the family.     Results  There are three possible results of BRCA1 and BRCA2 genetic testing:  Positive--a harmful mutation was identified  Negative--no mutation was identified  Variant of unknown significance--a variation in one of the genes was identified, but it is unclear how this impacts cancer risk in the family    Advantages and Disadvantages  There are advantages and disadvantages to genetic testing of these genes.    Advantages  May clarify your cancer risk  Can help you make medical decisions  May explain the cancers in your family  May give useful information to your family members (if you share your results)    Disadvantages  Possible negative emotional impact of learning about inherited cancer risk  Uncertainty in interpreting a negative test result in some situations  Possible genetic discrimination concerns (see below)    Inheritance   BRCA1 and BRCA2 mutations are inherited in an autosomal dominant pattern.  This means that if a parent has a mutation, each of his or her children will have a 50% chance of inheriting that same mutation.  Therefore, each child--male or female--would have a 50% chance of being at increased risk for developing cancer.                                              Image obtained from Genetics Home Reference, 2013     Genetic Information Nondiscrimination Act (JADON)  JADON is a federal law that protects individuals from health insurance or employment discrimination based on a genetic test result alone.  Although rare, there are currently no legal protections in terms of life insurance, long term care, or disability insurances.  Visit the National Human Genome Research [place] at Genome.gov/94883478 to learn more.    Reducing Cancer Risk  Current screening recommendations for women with a BRCA mutation include1:  Breast:  Breast awareness starting at age 18  Clinical breast exams starting at age 25; repeated every 6-12  months  Annual breast MRI starting at age 25 (or possibly younger)  Annual mammogram (consider tomosynthesis) and breast MRI at age 30 (for women with and without a breast cancer history)  Consideration of preventative mastectomy  Ovarian:   Consideration of transvaginal ultrasound and CA-125 blood test starting at age 30 (or possibly younger); repeated every 6 months  Recommendation for surgery to remove the ovaries and fallopian tubes after child bearing or by age 35-40. Women with BRCA2 mutations may delay this surgery until ages 40-45, unless it is warranted to consider sooner based on family history.  Some data suggests that women with BRCA1 mutations may be at slightly higher risk for serous uterine cancer. Information is limited at this time, and further research is needed to better understand this risk. Women with BRCA1 mutations should discuss the risks and benefits of hysterectomy at the time of ovary removal.     Current screening recommendations for men with a BRCA mutation include1:  Breast:  Self-breast exams starting at age 35  Annual clinical breast exams starting at age 35  Prostate:   Recommend prostate cancer screening starting at age 45 for BRCA2 carriers  Consider prostate cancer screening starting at age 45 for BRCA1 carriers    Both men and women with BRCA mutations should talk to their doctor or genetic counselor about screening for pancreatic cancer and melanoma.  In addition, the age at which to start screening may be modified based on family history of young cancer.    Questions to Think About Regarding Genetic Testing  What effect will the test result have on me and my relationship with my family members if I have an inherited gene mutation?  If I don t have a gene mutation?  Should I share my test results, and how will my family react to this news, which may also affect them?  Are my children ready to learn new information that may one day affect their own health?    Resources  FORCE:  Facing Our Risk of Cancer Empowered facingourrisk.org   Bright Pink bebrightpink.org   American Cancer Society (ACS) cancer.org   National Cancer [place] (NCI) cancer.gov     Please call us if you have any questions or concerns.   Cancer Risk Management Program [phone]-CANCER ([phone])  Bacilio Land, MS [facility]  [phone]  Nidia Richardson, MS, [facility] [phone]  Elizabeth Lees, MS, [facility]  [phone]  Cris Smith, MS, [facility]  [phone]  Kathleen Deutsch, MS, [facility]  [phone]  Sobeida Green, MS, [facility] [phone]  Ashlyn Herrera, MS, [facility] [phone]    References:  National Comprehensive Cancer Network. Clinical practice guidelines in oncology, colorectal cancer screening. Available online (registration required). 2019.

## 2022-10-11 NOTE — LETTER
10/11/2022         RE: Jones Miller  [address]  [facility] [address]        Dear Colleague,    Thank you for referring your patient, Jones Miller, to the [facility]. Please see a copy of my visit note below.    Jones is a 57 year old who is being evaluated via a billable video visit.      How would you like to obtain your AVS? MyChart  If the video visit is dropped, the invitation should be resent by: Text to cell phone: [phone]  Will anyone else be joining your video visit? No      Video-Visit Details    Video Start Time: 9:02 AM    Type of service:  Video Visit    Video End Time:9:25 AM    Originating Location (pt. Location): Home    Distant Location (provider location):  OFFSITE ([place])     Platform used for Video Visit: The Mad Video     10/11/2022    Presenting Information:   I met with Jones Miller today for genetic counseling at the Cancer Risk Management Program (virtual visit) to discuss his personal history of serrated polyps and family history of ovarian cancer, including a previously identified BRCA1 mutation in the family.  He is here today to review this history and available genetic testing options.    Personal History:  Jones is a 57 year old male. His personal history of colon polyps from his most recent colonoscopy, including two large sessile serrated adenomas and five smaller sessile serrated adenomas may be suggestive of serrated polyposis syndrome.  Follow up colonoscopy was recommended in one year.  He has two daughters and one son.     Family History: (Please see scanned pedigree for detailed family history information)    One sister was recently diagnosed with ovarian cancer and found to carry a BRCA1 gene mutation    One sister has had a history of colon polyps    Two paternal aunts were diagnosed with breast cancer    One paternal aunt was diagnosed with ovarian cancer    Discussion:    Jones has a family history of breast and ovarian cancer with  an identified BRCA1 gene mutation. A copy of his sisters positive test results was not available at the time of this visit.  We discussed the impact of this testing on Jones and his family in detail.      We discussed the natural history and genetics of hereditary breast and ovarian cancer caused by mutations in the BRCA1 and BRCA2 genes. A detailed handout regarding this cancer syndrome and the information we discussed can be found in the after visit summary.  Topics included: inheritance pattern, cancer risks, cancer screening recommendations, and also risks, benefits and limitations of testing.    We reviewed that mutations in the BRCA1 gene are inherited in an autosomal dominant pattern.  This means that Jones has a 50% chance of inheriting the same mutation which was identified in his family.     We discussed that there are additional genes that could cause increased risk for the cancers in his family.  We also discussed available genetic testing for serrated polyposis and hereditary colorectal cancer/polyps. As many of these genes present with overlapping features in a family and accurate cancer risk cannot always be established based upon the pedigree analysis alone, it would be reasonable for Jones to consider panel genetic testing to analyze multiple genes at once.     Jones elected to proceed with the [email or web address] Multi Cancer Panel and RNF43 gene.  Verbal consent was obtained and Jones will have his blood drawn and sent to [email or web address].  He is encouraged to obtain a copy of his sisters positive test report.    Selected genes: AIP, ALK, APC, YUMIKO, AXIN2, BAP1, BARD1, BLM, BMPR1A, BRCA1, BRCA2, BRIP1, CASR, CDC73, CDH1, CDK4, CDKN1B, CDKN1C, CDKN2A, CEBPA, CHEK2, CTNNA1, DICER1, DIS3L2, EGFR, EPCAM, FH, FLCN, GATA2, GPC3, GREM1, HOXB13, HRAS, KIT, MAX, MEN1, MET, MITF, MLH1, MSH2, MSH3, MSH6, MUTYH, NBN, NF1, NF2, NTHL1, PALB2, PDGFRA, PHOX2B, PMS2, POLD1, POLE, POT1, IMPYY8S, PTCH1, PTEN, RAD50,  RAD51C, RAD51D, RB1, RECQL4, RET, RUNX1, SDHA, SDHAF2, SDHB, SDHC, SDHD, SMAD4, SMARCA4, SMARCB1, SMARCE1, STK11, SUFU, TERC, TERT, [id number], TP53, TSC1, TSC2, VHL, WRN, WT1, RNF43    The information from genetic testing may determine additional cancer screening recommendations as well as options for risk reducing surgeries for Jones and his relatives.  These recommendations will be discussed in detail once genetic testing is completed.    Plan:  1) Today Jones elected to proceed with BRCA1/2 testing and the 85 gene Multi Cancer Panel + RNF43 gene through [facility].  2) This information should be available in 3-4 weeks.  3) Jones will schedule a return visit to discuss the results.          Again, thank you for allowing me to participate in the care of your patient.      Sincerely,    Cris Smith GC

## 2022-10-11 NOTE — PROGRESS NOTES
Jones is a 57 year old who is being evaluated via a billable video visit.      How would you like to obtain your AVS? MyChart  If the video visit is dropped, the invitation should be resent by: Text to cell phone: [phone]  Will anyone else be joining your video visit? No      Video-Visit Details    Video Start Time: 9:02 AM    Type of service:  Video Visit    Video End Time:9:25 AM    Originating Location (pt. Location): Home    Distant Location (provider location):  OFFSITE ([place])     Platform used for Video Visit: [employer]     10/11/2022    Presenting Information:   I met with Jones Miller today for genetic counseling at the Cancer Risk Management Program (virtual visit) to discuss his personal history of serrated polyps and family history of ovarian cancer, including a previously identified BRCA1 mutation in the family.  He is here today to review this history and available genetic testing options.    Personal History:  Jones is a 57 year old male. His personal history of colon polyps from his most recent colonoscopy, including two large sessile serrated adenomas and five smaller sessile serrated adenomas may be suggestive of serrated polyposis syndrome.  Follow up colonoscopy was recommended in one year.  He has two daughters and one son.     Family History: (Please see scanned pedigree for detailed family history information)    One sister was recently diagnosed with ovarian cancer and found to carry a BRCA1 gene mutation    One sister has had a history of colon polyps    Two paternal aunts were diagnosed with breast cancer    One paternal aunt was diagnosed with ovarian cancer    Discussion:    Jones has a family history of breast and ovarian cancer with an identified BRCA1 gene mutation. A copy of his sisters positive test results was not available at the time of this visit.  We discussed the impact of this testing on Jones and his family in detail.      We discussed the natural history and genetics of  hereditary breast and ovarian cancer caused by mutations in the BRCA1 and BRCA2 genes. A detailed handout regarding this cancer syndrome and the information we discussed can be found in the after visit summary.  Topics included: inheritance pattern, cancer risks, cancer screening recommendations, and also risks, benefits and limitations of testing.    We reviewed that mutations in the BRCA1 gene are inherited in an autosomal dominant pattern.  This means that Jones has a 50% chance of inheriting the same mutation which was identified in his family.     We discussed that there are additional genes that could cause increased risk for the cancers in his family.  We also discussed available genetic testing for serrated polyposis and hereditary colorectal cancer/polyps. As many of these genes present with overlapping features in a family and accurate cancer risk cannot always be established based upon the pedigree analysis alone, it would be reasonable for Jones to consider panel genetic testing to analyze multiple genes at once.     Jones elected to proceed with the [facility] Multi Cancer Panel and RNF43 gene.  Verbal consent was obtained and Jones will have his blood drawn and sent to [facility].  He is encouraged to obtain a copy of his sisters positive test report.    Selected genes: AIP, ALK, APC, YUMIKO, AXIN2, BAP1, BARD1, BLM, BMPR1A, BRCA1, BRCA2, BRIP1, CASR, CDC73, CDH1, CDK4, CDKN1B, CDKN1C, CDKN2A, CEBPA, CHEK2, CTNNA1, DICER1, DIS3L2, EGFR, EPCAM, FH, FLCN, GATA2, GPC3, GREM1, HOXB13, HRAS, KIT, MAX, MEN1, MET, MITF, MLH1, MSH2, MSH3, MSH6, MUTYH, NBN, NF1, NF2, NTHL1, PALB2, PDGFRA, PHOX2B, PMS2, POLD1, POLE, POT1, VWGWR8C, PTCH1, PTEN, RAD50, RAD51C, RAD51D, RB1, RECQL4, RET, RUNX1, SDHA, SDHAF2, SDHB, SDHC, SDHD, SMAD4, SMARCA4, SMARCB1, SMARCE1, STK11, SUFU, TERC, TERT, [id number], TP53, TSC1, TSC2, VHL, WRN, WT1, RNF43    The information from genetic testing may determine additional cancer  screening recommendations as well as options for risk reducing surgeries for Jones and his relatives.  These recommendations will be discussed in detail once genetic testing is completed.    Plan:  1) Today Jones elected to proceed with BRCA1/2 testing and the 85 gene Multi Cancer Panel + RNF43 gene through [facility].  2) This information should be available in 3-4 weeks.  3) Jones will schedule a return visit to discuss the results.    Cris Smith MS, [facility]  Licensed, Certified Genetic Counselor  [facility]  Phone: [phone]

## 2022-10-18 ENCOUNTER — LAB (OUTPATIENT)
Dept: LAB | Facility: CLINIC | Age: 57
End: 2022-10-18
Payer: COMMERCIAL

## 2022-10-18 ENCOUNTER — OFFICE VISIT (OUTPATIENT)
Dept: FAMILY MEDICINE | Facility: CLINIC | Age: 57
End: 2022-10-18
Attending: FAMILY MEDICINE
Payer: COMMERCIAL

## 2022-10-18 VITALS
HEART RATE: 66 BPM | WEIGHT: 250.4 LBS | HEIGHT: 72 IN | SYSTOLIC BLOOD PRESSURE: 118 MMHG | DIASTOLIC BLOOD PRESSURE: 68 MMHG | BODY MASS INDEX: 33.92 KG/M2 | OXYGEN SATURATION: 97 %

## 2022-10-18 DIAGNOSIS — Z80.41 FAMILY HISTORY OF OVARIAN CANCER: ICD-10-CM

## 2022-10-18 DIAGNOSIS — E88.810 METABOLIC SYNDROME: ICD-10-CM

## 2022-10-18 DIAGNOSIS — D12.6 SERRATED POLYPOSIS SYNDROME: ICD-10-CM

## 2022-10-18 DIAGNOSIS — E66.811 CLASS 1 OBESITY DUE TO EXCESS CALORIES WITH SERIOUS COMORBIDITY AND BODY MASS INDEX (BMI) OF 34.0 TO 34.9 IN ADULT: Primary | ICD-10-CM

## 2022-10-18 DIAGNOSIS — Z84.81 FAMILY HISTORY OF BRCA1 GENE POSITIVE: ICD-10-CM

## 2022-10-18 DIAGNOSIS — E78.1 HYPERTRIGLYCERIDEMIA: ICD-10-CM

## 2022-10-18 DIAGNOSIS — E66.09 CLASS 1 OBESITY DUE TO EXCESS CALORIES WITH SERIOUS COMORBIDITY AND BODY MASS INDEX (BMI) OF 34.0 TO 34.9 IN ADULT: Primary | ICD-10-CM

## 2022-10-18 DIAGNOSIS — E78.00 HYPERCHOLESTEREMIA: ICD-10-CM

## 2022-10-18 DIAGNOSIS — Z80.3 FAMILY HISTORY OF MALIGNANT NEOPLASM OF BREAST: ICD-10-CM

## 2022-10-18 PROCEDURE — 90471 IMMUNIZATION ADMIN: CPT | Performed by: FAMILY MEDICINE

## 2022-10-18 PROCEDURE — 36415 COLL VENOUS BLD VENIPUNCTURE: CPT

## 2022-10-18 PROCEDURE — 90682 RIV4 VACC RECOMBINANT DNA IM: CPT | Performed by: FAMILY MEDICINE

## 2022-10-18 PROCEDURE — 0134A COVID-19,PF,MODERNA BIVALENT: CPT | Performed by: FAMILY MEDICINE

## 2022-10-18 PROCEDURE — 99213 OFFICE O/P EST LOW 20 MIN: CPT | Mod: 25 | Performed by: FAMILY MEDICINE

## 2022-10-18 PROCEDURE — 91313 COVID-19,PF,MODERNA BIVALENT: CPT | Performed by: FAMILY MEDICINE

## 2022-10-18 PROCEDURE — 99000 SPECIMEN HANDLING OFFICE-LAB: CPT

## 2022-10-18 ASSESSMENT — ENCOUNTER SYMPTOMS: CONSTITUTIONAL NEGATIVE: 1

## 2022-10-18 NOTE — PROGRESS NOTES
"  Problem List Items Addressed This Visit    None      Subjective   Jones is a 57 year old who presents for the following health issues   Chief Complaint   Patient presents with     Weight Loss     F/u      Patient presents for treatment of chronic, comorbid conditions using intensive therapeutic lifestyle interventions including nutrition, physical activity, and behavior management.   - successes: working to improve nutrition.  Portion controls.  \"Lots of fruits and veggies.\"  No gluten.  Protein.  Smaller quantities.  \"feels really natural.\"   - struggles: none.   - exercise plan: active.     - tracking/journaling: ad collins.   - following nutritional plan: yes.  Deviations from plan: mimal   - hunger: controlled.    - medication benefits: helpful. side effects: none      History of Present Illness       Reason for visit:  Folow up on obesedy treatment    He eats 2-3 servings of fruits and vegetables daily.He consumes 0 sweetened beverage(s) daily.He exercises with enough effort to increase his heart rate 20 to 29 minutes per day.  He exercises with enough effort to increase his heart rate 5 days per week.   He is taking medications regularly.     Review of Systems   Constitutional: Negative.    All other systems reviewed and are negative.           Objective    /68 (BP Location: Left arm, Patient Position: Sitting, Cuff Size: Adult Large)   Pulse 66   Ht 1.822 m (5' 11.75\")   Wt 113.6 kg (250 lb 6.4 oz)   SpO2 97%   BMI 34.20 kg/m    Body mass index is 34.2 kg/m .  Physical Exam  Nursing note reviewed.   Constitutional:       General: He is not in acute distress.     Appearance: Normal appearance. He is not ill-appearing.   HENT:      Head: Normocephalic and atraumatic.   Eyes:      Extraocular Movements: Extraocular movements intact.      Conjunctiva/sclera: Conjunctivae normal.   Pulmonary:      Effort: Pulmonary effort is normal.   Neurological:      Mental Status: He is alert and oriented to person, " place, and time.   Psychiatric:         Attention and Perception: Attention normal.         Mood and Affect: Mood normal.         Speech: Speech normal.         Thought Content: Thought content normal.             This note has been dictated using voice recognition software. Any grammatical or context distortions are unintentional and inherent to the software

## 2022-10-18 NOTE — ASSESSMENT & PLAN NOTE
57 year old year old male in clinic today to discuss treatment of the following conditions through diet and lifestyle modification and weight loss:  1. Class 1 obesity due to excess calories with serious comorbidity and body mass index (BMI) of 34.0 to 34.9 in adult    2. Hypercholesteremia    3. Hypertriglyceridemia    4. Metabolic syndrome      The patient's weight loss result since the last visit was successful based on weight loss.  Has been doing well with decreased portions.  Mounjaro off-label.  Responding favorably.  Down 20 lbs   - continue.  Titrate to 7.5 mg.   Plan to maintain that dose?

## 2022-11-03 LAB — SCANNED LAB RESULT: NORMAL

## 2022-11-08 ENCOUNTER — VIRTUAL VISIT (OUTPATIENT)
Dept: ONCOLOGY | Facility: CLINIC | Age: 57
End: 2022-11-08
Attending: GENETIC COUNSELOR, MS
Payer: COMMERCIAL

## 2022-11-08 DIAGNOSIS — Z84.81 FAMILY HISTORY OF BRCA1 GENE POSITIVE: ICD-10-CM

## 2022-11-08 DIAGNOSIS — Z80.41 FAMILY HISTORY OF OVARIAN CANCER: ICD-10-CM

## 2022-11-08 DIAGNOSIS — D12.6 SERRATED POLYPOSIS SYNDROME: ICD-10-CM

## 2022-11-08 DIAGNOSIS — Z80.3 FAMILY HISTORY OF MALIGNANT NEOPLASM OF BREAST: Primary | ICD-10-CM

## 2022-11-08 PROCEDURE — 999N000069 HC STATISTIC GENETIC COUNSELING, < 16 MIN: Mod: GT,95 | Performed by: GENETIC COUNSELOR, MS

## 2022-11-08 NOTE — PROGRESS NOTES
"Jones is a 57 year old who is being evaluated via a billable video visit.      How would you like to obtain your AVS? MyChart  If the video visit is dropped, the invitation should be resent by: Text to cell phone: [phone]  Will anyone else be joining your video visit? No    Video-Visit Details    Time spent: 5 minutes    Type of service:  Video Visit    Originating Location (pt. Location): Home    Distant Location (provider location):  Off-site    Platform used for Video Visit: [employer]rent     11/08/2022    Referring Provider: Love Keller MD    Presenting Information:  Jones Miller returned to the Cancer Risk Management Program (virtual visit) to discuss his genetic testing results.     Genetic Testing Results: NEGATIVE   Jones's test results were negative.     The mutation that was identified in his family was in the BRCA1 gene. Specifically this mutation is called c.[id number]. Jones does not carry this variant.     In addition, no additional variants were detected in the 85 gene panel: AIP, ALK, APC, YUMIKO, AXIN2, BAP1, BARD1, BLM, BMPR1A, BRCA1, BRCA2, BRIP1, CASR, CDC73, CDH1, CDK4, CDKN1B, CDKN1C, CDKN2A, CEBPA, CHEK2, CTNNA1, DICER1, DIS3L2, EGFR, EPCAM, FH, FLCN, GATA2, GPC3, GREM1, HOXB13, HRAS, KIT, MAX, MEN1, MET, MITF, MLH1, MSH2, MSH3, MSH6, MUTYH, NBN, NF1, NF2, NTHL1, PALB2, PDGFRA, PHOX2B, PMS2, POLD1, POLE, POT1, AURML6O, PTCH1, PTEN, RAD50, RAD51C, RAD51D, RB1, RECQL4, RET, RUNX1, SDHA, SDHAF2, SDHB, SDHC, SDHD, SMAD4, SMARCA4, SMARCB1, SMARCE1, STK11, SUFU, TERC, TERT, [id number], TP53, TSC1, TSC2, VHL, WRN, WT1, RNF43    A copy of the test report can be found in the Laboratory tab, dated 10/18/2022, and named \"LABORATORY MISCELLANEOUS ORDER\". The report is scanned in as a linked document.    Interpretation:  Based on this test result, Jones does not have hereditary breast and ovarian cancer syndromes. Population cancer screening, such as those recommended by the American Cancer Society and " the National Comprehensive Cancer Network (NCCN), are also appropriate. These screening recommendations may change if there are changes to Jones's personal and/or family history of cancer. Final screening recommendations should be made by each individual's primary care provider.    Jones has a personal history of colon polyps, including two large sessile serrated adenomas and five smaller sessile serrated adenomas, which may be suggestive of serrated polyposis syndrome.  Follow up colonoscopy was recommended in one year.     Inheritance:  We discussed that Jones cannot pass on this mutation to his children based on this test result.  Mutations in this gene do not skip generations.      Plan:  1. I provided Jones with his test results.   2. Jones is encouraged to contact me regularly and/or with any changes to his personal/family history, as this information may inform future cancer screening or genetic testing recommendations.  3. If there are any further questions or concerns, he should not hesitate to contact me at [phone].    Cris Smith MS, [facility] [place]  Licensed, Certified Genetic Counselor  [facility]  Phone: [phone]

## 2022-11-08 NOTE — LETTER
Cancer Risk Management  Program Locations    [facility]  [facility]  Mailing Address  Cancer Risk Management Program  [address]  [place] [address]    New patient appointments  [phone]  November 11, 2022    Jones Miller  [address]  [facility] [address]      Dear Jones,    It was a pleasure talking with you via your virtual genetic counseling visit on 11/08/2022.  Below is a copy of the progress note from that recent visit through the Cancer Risk Management Program.  If you have any additional questions, please feel free to contact me.      Referring Provider: Love Keller MD    Presenting Information:  Jones Miller returned to the Cancer Risk Management Program (virtual visit) to discuss his genetic testing results.     Genetic Testing Results: NEGATIVE   Jones's test results were negative.     The mutation that was identified in his family was in the BRCA1 gene. Specifically this mutation is called c.122A>G. Jones does not carry this variant.     In addition, no additional variants were detected in the 85 gene panel: AIP, ALK, APC, YUMIKO, AXIN2, BAP1, BARD1, BLM, BMPR1A, BRCA1, BRCA2, BRIP1, CASR, CDC73, CDH1, CDK4, CDKN1B, CDKN1C, CDKN2A, CEBPA, CHEK2, CTNNA1, DICER1, DIS3L2, EGFR, EPCAM, FH, FLCN, GATA2, GPC3, GREM1, HOXB13, HRAS, KIT, MAX, MEN1, MET, MITF, MLH1, MSH2, MSH3, MSH6, MUTYH, NBN, NF1, NF2, NTHL1, PALB2, PDGFRA, PHOX2B, PMS2, POLD1, POLE, POT1, LSJPD1N, PTCH1, PTEN, RAD50, RAD51C, RAD51D, RB1, RECQL4, RET, RUNX1, SDHA, SDHAF2, SDHB, SDHC, SDHD, SMAD4, SMARCA4, SMARCB1, SMARCE1, STK11, SUFU, TERC, TERT, [id number], TP53, TSC1, TSC2, VHL, WRN, WT1, RNF43    Interpretation:  Based on this test result, Jones does not have hereditary breast and ovarian cancer syndromes.  Population cancer screening, such as those recommended by the American Cancer Society and the National Comprehensive Cancer Network (NCCN), are also appropriate. These screening recommendations may change if there are changes to Jones's personal and/or family history of cancer. Final screening recommendations should be made by each individual's primary care provider.    Jones has a personal history of colon polyps, including two large sessile serrated adenomas and five smaller sessile serrated adenomas, which may be suggestive of serrated polyposis syndrome.  Follow up colonoscopy was recommended in one year.     Inheritance:  We discussed that Jones cannot pass on this mutation to his children based on this test result.  Mutations in this gene do not skip generations.      Plan:  1. I provided Jones with his test results.   2. Jones is encouraged to contact me regularly and/or with any changes to his personal/family history, as this information may inform future cancer screening or genetic testing recommendations.  3. If there are any further questions or concerns, he should not hesitate to contact me at [phone].    Cris Smith MS, [facility] [place] [place]  Licensed, Certified Genetic Counselor  [facility]  Phone: [phone]

## 2022-11-08 NOTE — LETTER
"    11/8/2022         RE: Jones Miller  [address]  [facility] [address]        Dear Colleague,    Thank you for referring your patient, Jones Miller, to the [facility]. Please see a copy of my visit note below.    Jones is a 57 year old who is being evaluated via a billable video visit.      How would you like to obtain your AVS? MyChart  If the video visit is dropped, the invitation should be resent by: Text to cell phone: [phone]  Will anyone else be joining your video visit? No    Video-Visit Details    Time spent: 5 minutes    Type of service:  Video Visit    Originating Location (pt. Location): Home    Distant Location (provider location):  Off-site    Platform used for Video Visit: [employer]     11/08/2022    Referring Provider: Love Keller MD    Presenting Information:  Jones Miller returned to the Cancer Risk Management Program (virtual visit) to discuss his genetic testing results.     Genetic Testing Results: NEGATIVE   Jones's test results were negative.     The mutation that was identified in his family was in the BRCA1 gene. Specifically this mutation is called c.122A>G. Jones does not carry this variant.     In addition, no additional variants were detected in the 85 gene panel: AIP, ALK, APC, YUMIKO, AXIN2, BAP1, BARD1, BLM, BMPR1A, BRCA1, BRCA2, BRIP1, CASR, CDC73, CDH1, CDK4, CDKN1B, CDKN1C, CDKN2A, CEBPA, CHEK2, CTNNA1, DICER1, DIS3L2, EGFR, EPCAM, FH, FLCN, GATA2, GPC3, GREM1, HOXB13, HRAS, KIT, MAX, MEN1, MET, MITF, MLH1, MSH2, MSH3, MSH6, MUTYH, NBN, NF1, NF2, NTHL1, PALB2, PDGFRA, PHOX2B, PMS2, POLD1, POLE, POT1, AOYGC6N, PTCH1, PTEN, RAD50, RAD51C, RAD51D, RB1, RECQL4, RET, RUNX1, SDHA, SDHAF2, SDHB, SDHC, SDHD, SMAD4, SMARCA4, SMARCB1, SMARCE1, STK11, SUFU, TERC, TERT, [id number], TP53, TSC1, TSC2, VHL, WRN, WT1, RNF43    A copy of the test report can be found in the Laboratory tab, dated 10/18/2022, and named \"LABORATORY MISCELLANEOUS " "ORDER\". The report is scanned in as a linked document.    Interpretation:  Based on this test result, Jones does not have hereditary breast and ovarian cancer syndromes. Population cancer screening, such as those recommended by the American Cancer Society and the National Comprehensive Cancer Network (NCCN), are also appropriate. These screening recommendations may change if there are changes to Jones's personal and/or family history of cancer. Final screening recommendations should be made by each individual's primary care provider.    Jones has a personal history of colon polyps, including two large sessile serrated adenomas and five smaller sessile serrated adenomas, which may be suggestive of serrated polyposis syndrome.  Follow up colonoscopy was recommended in one year.     Inheritance:  We discussed that Jones cannot pass on this mutation to his children based on this test result.  Mutations in this gene do not skip generations.      Plan:  1. I provided Jones with his test results.   2. Jones is encouraged to contact me regularly and/or with any changes to his personal/family history, as this information may inform future cancer screening or genetic testing recommendations.  3. If there are any further questions or concerns, he should not hesitate to contact me at [phone].    Cris Smith MS, [facility]  Licensed, Certified Genetic Counselor  [facility]  Phone: [phone]          Again, thank you for allowing me to participate in the care of your patient.      Sincerely,    Cirs Smith GC    "

## 2022-11-30 ENCOUNTER — MYC MEDICAL ADVICE (OUTPATIENT)
Dept: FAMILY MEDICINE | Facility: CLINIC | Age: 57
End: 2022-11-30

## 2022-11-30 DIAGNOSIS — E88.810 METABOLIC SYNDROME: Primary | ICD-10-CM

## 2022-12-01 RX ORDER — TIRZEPATIDE 10 MG/.5ML
10 INJECTION, SOLUTION SUBCUTANEOUS WEEKLY
Qty: 2 ML | Refills: 1 | Status: SHIPPED | OUTPATIENT
Start: 2022-12-01 | End: 2022-12-29

## 2022-12-29 ENCOUNTER — TELEPHONE (OUTPATIENT)
Dept: FAMILY MEDICINE | Facility: CLINIC | Age: 57
End: 2022-12-29

## 2022-12-29 NOTE — TELEPHONE ENCOUNTER
Prior Authorization Retail Medication Request    Medication/Dose: Mounjaro 12.5 MG/0.5 ML SOPN  ICD code (if different than what is on RX):  N/A  Previously Tried and Failed:  N/A  Rationale:  N/A    Insurance Name:  [facility][note 3]  Insurance ID:  [id number]      Pharmacy Information (if different than what is on RX)  Name:  [facility]  Phone:  [phone]

## 2022-12-30 NOTE — TELEPHONE ENCOUNTER
Central Prior Authorization Team   Phone: [phone]    PA Initiation    Medication: Mounjaro 12.5 MG/0.5 ML SOPN  Insurance Company: [employer] - Phone [phone] Fax [phone]  Pharmacy Filling the Rx: [place], [place] - [address]  Filling Pharmacy Phone: [phone]  Filling Pharmacy Fax:    Start Date: 12/30/2022

## 2023-01-02 NOTE — TELEPHONE ENCOUNTER
PRIOR AUTHORIZATION DENIED    Medication: Mounjaro 12.5 MG/0.5 ML SOPN    Denial Date: 1/2/2023    Denial Rational: Medication is only covered if being prescribed for Type 2 Diabetes.  It is not covered for any other diagnosis.        Appeal Information:  If provider would like to appeal please provide a letter of medical necessity and route back to PA team.

## 2023-01-23 ENCOUNTER — MYC MEDICAL ADVICE (OUTPATIENT)
Dept: FAMILY MEDICINE | Facility: CLINIC | Age: 58
End: 2023-01-23
Payer: COMMERCIAL

## 2023-01-23 DIAGNOSIS — E88.810 METABOLIC SYNDROME: ICD-10-CM

## 2023-01-24 RX ORDER — TIRZEPATIDE 12.5 MG/.5ML
12.5 INJECTION, SOLUTION SUBCUTANEOUS WEEKLY
Qty: 2 ML | Refills: 1 | Status: SHIPPED | OUTPATIENT
Start: 2023-01-24 | End: 2023-02-20

## 2023-03-03 ENCOUNTER — OFFICE VISIT (OUTPATIENT)
Dept: FAMILY MEDICINE | Facility: CLINIC | Age: 58
End: 2023-03-03
Attending: FAMILY MEDICINE
Payer: COMMERCIAL

## 2023-03-03 VITALS
RESPIRATION RATE: 16 BRPM | HEIGHT: 72 IN | WEIGHT: 224.38 LBS | HEART RATE: 75 BPM | DIASTOLIC BLOOD PRESSURE: 74 MMHG | OXYGEN SATURATION: 100 % | TEMPERATURE: 97.6 F | BODY MASS INDEX: 30.39 KG/M2 | SYSTOLIC BLOOD PRESSURE: 113 MMHG

## 2023-03-03 DIAGNOSIS — E78.1 HYPERTRIGLYCERIDEMIA: Primary | ICD-10-CM

## 2023-03-03 DIAGNOSIS — E66.811 CLASS 1 OBESITY DUE TO EXCESS CALORIES WITH SERIOUS COMORBIDITY AND BODY MASS INDEX (BMI) OF 30.0 TO 30.9 IN ADULT: ICD-10-CM

## 2023-03-03 DIAGNOSIS — E78.00 HYPERCHOLESTEREMIA: ICD-10-CM

## 2023-03-03 DIAGNOSIS — E88.810 METABOLIC SYNDROME: ICD-10-CM

## 2023-03-03 DIAGNOSIS — E66.09 CLASS 1 OBESITY DUE TO EXCESS CALORIES WITH SERIOUS COMORBIDITY AND BODY MASS INDEX (BMI) OF 30.0 TO 30.9 IN ADULT: ICD-10-CM

## 2023-03-03 PROCEDURE — 99213 OFFICE O/P EST LOW 20 MIN: CPT | Performed by: FAMILY MEDICINE

## 2023-03-03 ASSESSMENT — PATIENT HEALTH QUESTIONNAIRE - PHQ9
10. IF YOU CHECKED OFF ANY PROBLEMS, HOW DIFFICULT HAVE THESE PROBLEMS MADE IT FOR YOU TO DO YOUR WORK, TAKE CARE OF THINGS AT HOME, OR GET ALONG WITH OTHER PEOPLE: SOMEWHAT DIFFICULT
SUM OF ALL RESPONSES TO PHQ QUESTIONS 1-9: 3
SUM OF ALL RESPONSES TO PHQ QUESTIONS 1-9: 3

## 2023-03-03 ASSESSMENT — ENCOUNTER SYMPTOMS: CONSTITUTIONAL NEGATIVE: 1

## 2023-03-03 NOTE — ASSESSMENT & PLAN NOTE
57 year old year old male in clinic today to discuss treatment of the following conditions through diet and lifestyle modification and weight loss:  1. Hypertriglyceridemia    2. Metabolic syndrome    3. Class 1 obesity due to excess calories with serious comorbidity and body mass index (BMI) of 30.0 to 30.9 in adult    4. Hypercholesteremia      The patient's weight loss result since the last visit was successful based on weight loss.  Doing well on tirzapetide.  Minimal side effects.  Energy high.  He is eating whole food nutritious menu.  He prefers walking as his ideal exercise.  - Continue GLP-1 receptor agonist.  For now, we will continue tirzepatide.  He is still using the saver coupon that was available last summer.  We discussed that this is somewhat precarious.  Since starting this medication, he has lost approximately 40 pounds or approximately 15% of his total body weight.  I believe this medicine overall has been a net benefit.  I am concerned that the saver coupon will no longer work.  There is anticipation that tirzepatide will be approved with an FDA label for weight loss sometime in 2023.  Based on the formulary that loads into epic, I believe he would get coverage for semaglutide (Wegovy).  We discussed that there would be some advantage to switching to that medication now.  The patient's preference is to maintain the status quo.  If necessary we will identify an alternative plan.  This seems reasonable given his success thus far.  Follow-up recommended for 3 to 6 months.

## 2023-03-03 NOTE — PROGRESS NOTES
Problem List Items Addressed This Visit     Class 1 obesity due to excess calories with serious comorbidity and body mass index (BMI) of 30.0 to 30.9 in adult     57 year old year old male in clinic today to discuss treatment of the following conditions through diet and lifestyle modification and weight loss:  1. Hypertriglyceridemia    2. Metabolic syndrome    3. Class 1 obesity due to excess calories with serious comorbidity and body mass index (BMI) of 30.0 to 30.9 in adult    4. Hypercholesteremia      The patient's weight loss result since the last visit was successful based on weight loss.  Doing well on tirzapetide.  Minimal side effects.  Energy high.  He is eating whole food nutritious menu.  He prefers walking as his ideal exercise.  - Continue GLP-1 receptor agonist.  For now, we will continue tirzepatide.  He is still using the saver coupon that was available last summer.  We discussed that this is somewhat precarious.  Since starting this medication, he has lost approximately 40 pounds or approximately 15% of his total body weight.  I believe this medicine overall has been a net benefit.  I am concerned that the saver coupon will no longer work.  There is anticipation that tirzepatide will be approved with an FDA label for weight loss sometime in 2023.  Based on the formulary that loads into epic, I believe he would get coverage for semaglutide (Wegovy).  We discussed that there would be some advantage to switching to that medication now.  The patient's preference is to maintain the status quo.  If necessary we will identify an alternative plan.  This seems reasonable given his success thus far.  Follow-up recommended for 3 to 6 months.         Hypercholesteremia    Hypertriglyceridemia - Primary    Metabolic syndrome      Follow-up Visit   Expected date:  Jul 03, 2023 (Approximate)      Follow Up Appointment Details:     Follow-up with whom?: Me    Follow-Up for what?: Chronic Disease f/u    Chronic  "Disease f/u: General (Other)    Additional Details: Weight loss visit    How?: In Person    Is this an as-needed follow-up?: No                     Subjective   Jones is a 57 year old who presents for the following health issues   Chief Complaint   Patient presents with     Weight Loss     Follow-up        Patient presents for treatment of chronic, comorbid conditions using intensive therapeutic lifestyle interventions including nutrition, physical activity, and behavior management.   - successes: hunger has been controlled.  \"Healthy foods mostly.\"  Continues to los weight, \"slowly now.\"    - struggles: concerned that his medication will not be covered.   - exercise plan: walking but less given the road conditions. Planning for three mile loop.    - tracking/journaling: \"clean healthy foods.\"   \"Eating basic foods.\"   - following nutritional plan: yes.  Deviations from plan: infrequent   - hunger: controlled.   - medication benefits: helpful. side effects: minimal.      History of Present Illness       Reason for visit:  Follow up on Mounjaro usage    He eats 2-3 servings of fruits and vegetables daily.He consumes 0 sweetened beverage(s) daily.He exercises with enough effort to increase his heart rate 10 to 19 minutes per day.  He exercises with enough effort to increase his heart rate 7 days per week. He is missing 1 dose(s) of medications per week.    Today's PHQ-9         PHQ-9 Total Score: 3    PHQ-9 Q9 Thoughts of better off dead/self-harm past 2 weeks :   Not at all    How difficult have these problems made it for you to do your work, take care of things at home, or get along with other people: Somewhat difficult       Review of Systems   Constitutional: Negative.    All other systems reviewed and are negative.           Objective    /74 (BP Location: Left arm, Patient Position: Sitting, Cuff Size: Adult Large)   Pulse 75   Temp 97.6  F (36.4  C) (Oral)   Resp 16   Ht 1.816 m (5' 11.5\")   Wt 101.8 " kg (224 lb 6 oz)   SpO2 100%   BMI 30.86 kg/m    Body mass index is 30.86 kg/m .  Physical Exam  Nursing note reviewed.   Constitutional:       General: He is not in acute distress.     Appearance: Normal appearance. He is not ill-appearing.   HENT:      Head: Normocephalic and atraumatic.   Eyes:      Extraocular Movements: Extraocular movements intact.      Conjunctiva/sclera: Conjunctivae normal.   Pulmonary:      Effort: Pulmonary effort is normal.   Neurological:      Mental Status: He is alert and oriented to person, place, and time.   Psychiatric:         Attention and Perception: Attention normal.         Mood and Affect: Mood normal.         Speech: Speech normal.         Thought Content: Thought content normal.             This note has been dictated using voice recognition software. Any grammatical or context distortions are unintentional and inherent to the software

## 2023-03-10 ENCOUNTER — TELEPHONE (OUTPATIENT)
Dept: GASTROENTEROLOGY | Facility: CLINIC | Age: 58
End: 2023-03-10
Payer: COMMERCIAL

## 2023-03-10 ENCOUNTER — PATIENT OUTREACH (OUTPATIENT)
Dept: GASTROENTEROLOGY | Facility: CLINIC | Age: 58
End: 2023-03-10
Payer: COMMERCIAL

## 2023-03-10 DIAGNOSIS — Z12.11 SPECIAL SCREENING FOR MALIGNANT NEOPLASMS, COLON: Primary | ICD-10-CM

## 2023-03-10 NOTE — PROGRESS NOTES
"Ordering/Referring Provider: Feliberto Knight    BMI: Estimated body mass index is 30.86 kg/m  as calculated from the following:    Height as of 3/3/23: 1.816 m (5' 11.5\").    Weight as of 3/3/23: 101.8 kg (224 lb 6 oz).     Sedation:  Based on patients medical history patient is appropriate for   moderate sedation.   If patient BMI > 50 do not schedule in ASC.    Location:  Does patient have an LVAD?  No    Does patient have a history of asthma, COPD or any other lung disease?  No    Does patient have a history of cardiac disease?  No    Is pataient awaiting a heart or lung transplant?   No    Has patient had a stroke or transient ischemic attack in the last 6 months?   No    Is the patient currently on dialysis?   No    Prep:  Previous prep (last colonoscopy):   Standard MiraLAX    Quality of previous prep:   Good    Is patient currently on dialysis, is ESRD, or CKD stage 4/5?   No (standard prep)    Does patient have a diagnosis of diabetes?  No    Does patient have a diagnosis of cystic fibrosis (CF)?  No    BMI > 40?  No    Final Referral Status:  meets the criteria for placement of colonoscopy screening  referral order.      Referral order placed with the following recommendations:  Sedation: MAC/Deep Sedation  Location Type: ASC  Prep: Standard Golytely  PAC: No      "

## 2023-05-02 ENCOUNTER — NURSE TRIAGE (OUTPATIENT)
Dept: NURSING | Facility: CLINIC | Age: 58
End: 2023-05-02
Payer: COMMERCIAL
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
## 2023-05-15 ENCOUNTER — MYC MEDICAL ADVICE (OUTPATIENT)
Dept: FAMILY MEDICINE | Facility: CLINIC | Age: 58
End: 2023-05-15
Payer: COMMERCIAL

## 2023-05-15 DIAGNOSIS — E88.810 METABOLIC SYNDROME: ICD-10-CM

## 2023-05-15 RX ORDER — TIRZEPATIDE 12.5 MG/.5ML
12.5 INJECTION, SOLUTION SUBCUTANEOUS WEEKLY
Qty: 2 ML | Refills: 3 | Status: SHIPPED | OUTPATIENT
Start: 2023-05-15 | End: 2023-10-20

## 2023-07-14 ENCOUNTER — MYC MEDICAL ADVICE (OUTPATIENT)
Dept: FAMILY MEDICINE | Facility: CLINIC | Age: 58
End: 2023-07-14
Payer: COMMERCIAL

## 2023-07-14 DIAGNOSIS — E78.1 HYPERTRIGLYCERIDEMIA: ICD-10-CM

## 2023-07-14 DIAGNOSIS — E66.09 CLASS 1 OBESITY DUE TO EXCESS CALORIES WITH SERIOUS COMORBIDITY AND BODY MASS INDEX (BMI) OF 30.0 TO 30.9 IN ADULT: Primary | ICD-10-CM

## 2023-07-14 DIAGNOSIS — E78.00 HYPERCHOLESTEREMIA: ICD-10-CM

## 2023-07-14 DIAGNOSIS — E88.810 METABOLIC SYNDROME: ICD-10-CM

## 2023-07-14 DIAGNOSIS — E66.811 CLASS 1 OBESITY DUE TO EXCESS CALORIES WITH SERIOUS COMORBIDITY AND BODY MASS INDEX (BMI) OF 30.0 TO 30.9 IN ADULT: Primary | ICD-10-CM

## 2023-07-18 RX ORDER — SEMAGLUTIDE 1.7 MG/.75ML
1.7 INJECTION, SOLUTION SUBCUTANEOUS WEEKLY
Qty: 3 ML | Refills: 0 | Status: SHIPPED | OUTPATIENT
Start: 2023-07-18 | End: 2023-08-24

## 2023-08-02 ENCOUNTER — TRANSFERRED RECORDS (OUTPATIENT)
Dept: HEALTH INFORMATION MANAGEMENT | Facility: CLINIC | Age: 58
End: 2023-08-02
Payer: COMMERCIAL

## 2023-08-07 ENCOUNTER — PATIENT OUTREACH (OUTPATIENT)
Dept: CARE COORDINATION | Facility: CLINIC | Age: 58
End: 2023-08-07
Payer: COMMERCIAL

## 2023-08-21 ENCOUNTER — PATIENT OUTREACH (OUTPATIENT)
Dept: CARE COORDINATION | Facility: CLINIC | Age: 58
End: 2023-08-21
Payer: COMMERCIAL

## 2023-08-24 DIAGNOSIS — E78.1 HYPERTRIGLYCERIDEMIA: ICD-10-CM

## 2023-08-24 DIAGNOSIS — E88.810 METABOLIC SYNDROME: ICD-10-CM

## 2023-08-24 DIAGNOSIS — E78.00 HYPERCHOLESTEREMIA: ICD-10-CM

## 2023-08-24 DIAGNOSIS — E66.09 CLASS 1 OBESITY DUE TO EXCESS CALORIES WITH SERIOUS COMORBIDITY AND BODY MASS INDEX (BMI) OF 30.0 TO 30.9 IN ADULT: ICD-10-CM

## 2023-08-24 DIAGNOSIS — E66.811 CLASS 1 OBESITY DUE TO EXCESS CALORIES WITH SERIOUS COMORBIDITY AND BODY MASS INDEX (BMI) OF 30.0 TO 30.9 IN ADULT: ICD-10-CM

## 2023-08-24 RX ORDER — SEMAGLUTIDE 1.7 MG/.75ML
INJECTION, SOLUTION SUBCUTANEOUS
Qty: 3 ML | Refills: 0 | Status: SHIPPED | OUTPATIENT
Start: 2023-08-24 | End: 2023-10-20

## 2023-08-24 NOTE — TELEPHONE ENCOUNTER
Patient states that he is tolerating this fine and is ready to move up to the next dose he would like a new Rx for the increased dosage. His insurance requires a PA for each new dose so he would like a refill of the 1.7MG to continue until the PA for the next dose gets processed.    Patient was due for his next injection yesterday and is requesting an expedited refill asap.    If increased dose is prescribed as well please route back to [facility] sched pool for PA initiation.      Please advise.

## 2023-10-12 ENCOUNTER — PATIENT OUTREACH (OUTPATIENT)
Dept: GASTROENTEROLOGY | Facility: CLINIC | Age: 58
End: 2023-10-12
Payer: COMMERCIAL

## 2023-10-14 ENCOUNTER — HEALTH MAINTENANCE LETTER (OUTPATIENT)
Age: 58
End: 2023-10-14

## 2023-10-20 ENCOUNTER — OFFICE VISIT (OUTPATIENT)
Dept: FAMILY MEDICINE | Facility: CLINIC | Age: 58
End: 2023-10-20
Attending: FAMILY MEDICINE
Payer: COMMERCIAL

## 2023-10-20 VITALS
HEIGHT: 72 IN | HEART RATE: 69 BPM | OXYGEN SATURATION: 95 % | BODY MASS INDEX: 30.98 KG/M2 | TEMPERATURE: 98.7 F | RESPIRATION RATE: 16 BRPM | DIASTOLIC BLOOD PRESSURE: 83 MMHG | SYSTOLIC BLOOD PRESSURE: 127 MMHG | WEIGHT: 228.7 LBS

## 2023-10-20 DIAGNOSIS — E78.00 HYPERCHOLESTEREMIA: ICD-10-CM

## 2023-10-20 DIAGNOSIS — E66.811 CLASS 1 OBESITY DUE TO EXCESS CALORIES WITH SERIOUS COMORBIDITY AND BODY MASS INDEX (BMI) OF 31.0 TO 31.9 IN ADULT: Primary | ICD-10-CM

## 2023-10-20 DIAGNOSIS — Z13.21 SCREENING FOR ENDOCRINE, NUTRITIONAL, METABOLIC AND IMMUNITY DISORDER: ICD-10-CM

## 2023-10-20 DIAGNOSIS — Z13.29 SCREENING FOR ENDOCRINE, NUTRITIONAL, METABOLIC AND IMMUNITY DISORDER: ICD-10-CM

## 2023-10-20 DIAGNOSIS — E78.1 HYPERTRIGLYCERIDEMIA: ICD-10-CM

## 2023-10-20 DIAGNOSIS — E66.09 CLASS 1 OBESITY DUE TO EXCESS CALORIES WITH SERIOUS COMORBIDITY AND BODY MASS INDEX (BMI) OF 31.0 TO 31.9 IN ADULT: Primary | ICD-10-CM

## 2023-10-20 DIAGNOSIS — E88.810 METABOLIC SYNDROME: ICD-10-CM

## 2023-10-20 DIAGNOSIS — Z13.0 SCREENING FOR ENDOCRINE, NUTRITIONAL, METABOLIC AND IMMUNITY DISORDER: ICD-10-CM

## 2023-10-20 DIAGNOSIS — Z13.228 SCREENING FOR ENDOCRINE, NUTRITIONAL, METABOLIC AND IMMUNITY DISORDER: ICD-10-CM

## 2023-10-20 LAB
ALT SERPL W P-5'-P-CCNC: 20 U/L (ref 0–70)
ANION GAP SERPL CALCULATED.3IONS-SCNC: 10 MMOL/L (ref 7–15)
BUN SERPL-MCNC: 12.2 MG/DL (ref 8–23)
CALCIUM SERPL-MCNC: 9.3 MG/DL (ref 8.6–10)
CHLORIDE SERPL-SCNC: 104 MMOL/L (ref 98–107)
CHOLEST SERPL-MCNC: 177 MG/DL
CREAT SERPL-MCNC: 0.98 MG/DL (ref 0.67–1.17)
DEPRECATED HCO3 PLAS-SCNC: 26 MMOL/L (ref 22–29)
EGFRCR SERPLBLD CKD-EPI 2021: 89 ML/MIN/1.73M2
GLUCOSE SERPL-MCNC: 89 MG/DL (ref 70–99)
HDLC SERPL-MCNC: 46 MG/DL
LDLC SERPL CALC-MCNC: 112 MG/DL
NONHDLC SERPL-MCNC: 131 MG/DL
POTASSIUM SERPL-SCNC: 4.6 MMOL/L (ref 3.4–5.3)
SODIUM SERPL-SCNC: 140 MMOL/L (ref 135–145)
TRIGL SERPL-MCNC: 96 MG/DL
TSH SERPL DL<=0.005 MIU/L-ACNC: 2.96 UIU/ML (ref 0.3–4.2)
VIT D+METAB SERPL-MCNC: 19 NG/ML (ref 20–50)

## 2023-10-20 PROCEDURE — 80061 LIPID PANEL: CPT | Performed by: FAMILY MEDICINE

## 2023-10-20 PROCEDURE — 91320 SARSCV2 VAC 30MCG TRS-SUC IM: CPT | Performed by: FAMILY MEDICINE

## 2023-10-20 PROCEDURE — 84443 ASSAY THYROID STIM HORMONE: CPT | Performed by: FAMILY MEDICINE

## 2023-10-20 PROCEDURE — 90471 IMMUNIZATION ADMIN: CPT | Performed by: FAMILY MEDICINE

## 2023-10-20 PROCEDURE — 90480 ADMN SARSCOV2 VAC 1/ONLY CMP: CPT | Performed by: FAMILY MEDICINE

## 2023-10-20 PROCEDURE — 99214 OFFICE O/P EST MOD 30 MIN: CPT | Mod: 25 | Performed by: FAMILY MEDICINE

## 2023-10-20 PROCEDURE — 80048 BASIC METABOLIC PNL TOTAL CA: CPT | Performed by: FAMILY MEDICINE

## 2023-10-20 PROCEDURE — 90682 RIV4 VACC RECOMBINANT DNA IM: CPT | Performed by: FAMILY MEDICINE

## 2023-10-20 PROCEDURE — 82306 VITAMIN D 25 HYDROXY: CPT | Performed by: FAMILY MEDICINE

## 2023-10-20 PROCEDURE — 36415 COLL VENOUS BLD VENIPUNCTURE: CPT | Performed by: FAMILY MEDICINE

## 2023-10-20 PROCEDURE — 84460 ALANINE AMINO (ALT) (SGPT): CPT | Performed by: FAMILY MEDICINE

## 2023-10-20 ASSESSMENT — ENCOUNTER SYMPTOMS: CONSTITUTIONAL NEGATIVE: 1

## 2023-10-20 ASSESSMENT — PATIENT HEALTH QUESTIONNAIRE - PHQ9
SUM OF ALL RESPONSES TO PHQ QUESTIONS 1-9: 7
SUM OF ALL RESPONSES TO PHQ QUESTIONS 1-9: 7
10. IF YOU CHECKED OFF ANY PROBLEMS, HOW DIFFICULT HAVE THESE PROBLEMS MADE IT FOR YOU TO DO YOUR WORK, TAKE CARE OF THINGS AT HOME, OR GET ALONG WITH OTHER PEOPLE: SOMEWHAT DIFFICULT

## 2023-10-20 NOTE — ASSESSMENT & PLAN NOTE
58 year old year old male in clinic today to discuss treatment of the following conditions through diet and lifestyle modification and weight loss:  1. Class 1 obesity due to excess calories with serious comorbidity and body mass index (BMI) of 31.0 to 31.9 in adult    2. Hypercholesteremia    3. Hypertriglyceridemia    4. Metabolic syndrome      The patient's weight loss result since the last visit was successful based on overall weight loss.     Current therapies:    Medications: YES. Initially on tirzapetite. Now on semaglutide.  Doing well maintaining weight.   - Starting weight for GLP1 receptor agonist: 236 lb   - Total weight loss: 35 lbs (13.3%)    Nutritional goals/strategies: reviewed.   - caloric restriction: Yes   - time restriction: NO   - diet (macronutrient) framework: high protein/low carbohydrate     Movement:   - predominantly cardiovascular    Follow-up: 6 months

## 2023-10-20 NOTE — PROGRESS NOTES
"  Assessment & Plan   Problem List Items Addressed This Visit       Class 1 obesity due to excess calories with serious comorbidity and body mass index (BMI) of 31.0 to 31.9 in adult - Primary     58 year old year old male in clinic today to discuss treatment of the following conditions through diet and lifestyle modification and weight loss:  1. Class 1 obesity due to excess calories with serious comorbidity and body mass index (BMI) of 31.0 to 31.9 in adult    2. Hypercholesteremia    3. Hypertriglyceridemia    4. Metabolic syndrome    The patient's weight loss result since the last visit was successful based on overall weight loss.     Current therapies:    Medications: YES. Initially on tirzapetite. Now on semaglutide.  Doing well maintaining weight.   - Starting weight for GLP1 receptor agonist: 236 lb   - Total weight loss: 35 lbs (13.3%)    Nutritional goals/strategies: reviewed.   - caloric restriction: Yes   - time restriction: NO   - diet (macronutrient) framework: high protein/low carbohydrate     Movement:   - predominantly cardiovascular    Follow-up: 6 months           Relevant Orders    Vitamin D Deficiency    Hypercholesteremia    Relevant Orders    ALT    Lipid panel reflex to direct LDL Fasting    Vitamin D Deficiency    Hypertriglyceridemia    Relevant Orders    Lipid panel reflex to direct LDL Fasting    Vitamin D Deficiency    Metabolic syndrome    Relevant Orders    Basic metabolic panel  (Ca, Cl, CO2, Creat, Gluc, K, Na, BUN)     Other Visit Diagnoses       Screening for endocrine, nutritional, metabolic and immunity disorder        Relevant Orders    TSH with free T4 reflex             BMI:   Estimated body mass index is 31.45 kg/m  as calculated from the following:    Height as of this encounter: 1.816 m (5' 11.5\").    Weight as of this encounter: 103.7 kg (228 lb 11.2 oz).   Weight management plan: Specific weight management program called Comprehensive weight management " "discussed    Feliberto Knight MD  [facility] BAN Goldman is a 58 year old, presenting for the following health issues:  Weight Loss (Follow-up/)        10/20/2023     7:00 AM   Additional Questions   Roomed by xl       Patient presents for treatment of chronic, comorbid conditions using intensive therapeutic lifestyle interventions including nutrition, physical activity, and behavior management.   - successes: he had lost a little weight while not on therapy. Some weight loss while on wegovy.    - struggles: appetite increased since going on wegovy.  \"I still have a sweet tooth.\" \"I do better and feel better when I stay away from carbs.\"  Wife is vegetarian (many reasons)    - exercise plan: dropped.    - tracking/journaling: ad collins.  Caloric restriction.  Generally.  Lots of bag salads.  He had been doing protein powders.    - following nutritional plan: yes.  Deviations from plan: convenience eating.   - hunger: controlled.   - medication benefits: helpful. side effects: minima?  Decreased motivation and energy.       History of Present Illness       Diabetes:   He presents for follow up of diabetes.    He is not checking blood glucose.        He is concerned about other.    He is not experiencing numbness or burning in feet, excessive thirst, blurry vision, weight changes or redness, sores or blisters on feet.           Reason for visit:  Obesidy    He eats 2-3 servings of fruits and vegetables daily.He consumes 0 sweetened beverage(s) daily.He exercises with enough effort to increase his heart rate 30 to 60 minutes per day.  He exercises with enough effort to increase his heart rate 4 days per week.   He is taking medications regularly.      Review of Systems   Constitutional: Negative.    All other systems reviewed and are negative.           Objective    /83 (BP Location: Left arm, Patient Position: Sitting, Cuff Size: Adult Regular)   Pulse 69   Temp 98.7  F (37.1 " " C) (Oral)   Resp 16   Ht 1.816 m (5' 11.5\")   Wt 103.7 kg (228 lb 11.2 oz)   SpO2 95%   BMI 31.45 kg/m    Body mass index is 31.45 kg/m .  Physical Exam  Nursing note reviewed.   Constitutional:       General: He is not in acute distress.     Appearance: Normal appearance. He is not ill-appearing.   HENT:      Head: Normocephalic and atraumatic.   Eyes:      Extraocular Movements: Extraocular movements intact.      Conjunctiva/sclera: Conjunctivae normal.   Pulmonary:      Effort: Pulmonary effort is normal.   Neurological:      Mental Status: He is alert and oriented to person, place, and time.   Psychiatric:         Attention and Perception: Attention normal.         Mood and Affect: Mood normal.         Speech: Speech normal.         Thought Content: Thought content normal.                        "

## 2023-12-07 ENCOUNTER — OFFICE VISIT (OUTPATIENT)
Dept: AUDIOLOGY | Facility: CLINIC | Age: 58
End: 2023-12-07
Attending: AUDIOLOGIST
Payer: COMMERCIAL

## 2023-12-07 DIAGNOSIS — H90.3 SENSORINEURAL HEARING LOSS, BILATERAL: Primary | ICD-10-CM

## 2023-12-07 PROCEDURE — 92550 TYMPANOMETRY & REFLEX THRESH: CPT | Performed by: AUDIOLOGIST

## 2023-12-07 PROCEDURE — 92557 COMPREHENSIVE HEARING TEST: CPT | Performed by: AUDIOLOGIST

## 2023-12-07 NOTE — PROGRESS NOTES
AUDIOLOGY REPORT    SUBJECTIVE:  Jones Miller is a 58 year old male who was seen in the Audiology Clinic at the [facility] for audiologic evaluation, referred by self. The patient reports difficulty hearing, particularly in noisy environments, over the past 6-7 years. He reports getting frustrated in noisy environments when he has a hard time hearing. He reports binaural intermittent tinnitus over the same time frame that is sometimes bothersome, but does not interfere with daily activities. Reports family history of hearing loss in his father and grandmother. Has a history of noise exposure with music and power tools. He now uses hearing protection, but did not in the past. Denies otalgia, otorrhea, dizziness, and prior ear surgery. Prior audio 01/12/2017 showed normal hearing sloping to mild sensorineural hearing loss bilaterally.    OBJECTIVE:  Abuse Screening:  Do you feel unsafe at home or work/school? No  Do you feel threatened by someone? No  Does anyone try to keep you from having contact with others, or doing things outside of your home? No  Physical signs of abuse present? No     Fall Risk Screen:  1. Have you fallen two or more times in the past year? No  2. Have you fallen and had an injury in the past year? No    Otoscopic exam indicates ears are clear of cerumen bilaterally     Pure Tone Thresholds assessed using conventional audiometry with good reliability from 250-8000 Hz bilaterally using insert earphones and circumaural headphones     RIGHT:  Normal hearing through 2000 Hz sloping to mild sensorineural hearing loss    LEFT:    Normal hearing through 2000 Hz sloping to mild sensorineural hearing loss    Tympanogram:    RIGHT: normal eardrum mobility    LEFT:   normal eardrum mobility    Reflexes (reported by stimulus ear):  RIGHT: Ipsilateral is present at normal levels  RIGHT: Contralateral is present at normal levels  LEFT:   Ipsilateral is present at normal  levels  LEFT:   Contralateral is present at normal levels    Speech Reception Threshold:    RIGHT: 10 dB HL    LEFT:   10 dB HL  Word Recognition Score:     RIGHT: 100% at 60 dB HL using NU-6 recorded word list.    LEFT:   100% at 60 dB HL using NU-6 recorded word list.      ASSESSMENT:   Normal hearing through 2000 Hz sloping to mild sensorineural hearing loss, bilaterally. Compared to results from 01/12/2017, hearing has remained stable. Today s results were discussed with the patient in detail.     PLAN:  Patient was counseled regarding hearing loss and impact on communication. Hearing aid consultation/a trial with amplification is recommended, particularly in light of the report of frustration in noisy environments. The patient was provided with information regarding hearing aids and the phone number or scheduling. Repeat audiologic evaluation is recommended in 2-3 years. Please call this clinic with questions regarding these results or recommendations.      Sabi Khoury, [facility]  [place] Licensed Audiologist #[id number]

## 2023-12-14 ENCOUNTER — MYC REFILL (OUTPATIENT)
Dept: FAMILY MEDICINE | Facility: CLINIC | Age: 58
End: 2023-12-14
Payer: COMMERCIAL

## 2023-12-14 DIAGNOSIS — E66.09 CLASS 1 OBESITY DUE TO EXCESS CALORIES WITH SERIOUS COMORBIDITY AND BODY MASS INDEX (BMI) OF 30.0 TO 30.9 IN ADULT: ICD-10-CM

## 2023-12-14 DIAGNOSIS — E78.00 HYPERCHOLESTEREMIA: ICD-10-CM

## 2023-12-14 DIAGNOSIS — E66.811 CLASS 1 OBESITY DUE TO EXCESS CALORIES WITH SERIOUS COMORBIDITY AND BODY MASS INDEX (BMI) OF 30.0 TO 30.9 IN ADULT: ICD-10-CM

## 2023-12-14 DIAGNOSIS — E88.810 METABOLIC SYNDROME: ICD-10-CM

## 2023-12-14 DIAGNOSIS — E78.1 HYPERTRIGLYCERIDEMIA: ICD-10-CM

## 2024-01-18 ENCOUNTER — TELEPHONE (OUTPATIENT)
Dept: FAMILY MEDICINE | Facility: CLINIC | Age: 59
End: 2024-01-18

## 2024-01-18 ENCOUNTER — OFFICE VISIT (OUTPATIENT)
Dept: FAMILY MEDICINE | Facility: CLINIC | Age: 59
End: 2024-01-18
Payer: COMMERCIAL

## 2024-01-18 VITALS
HEIGHT: 72 IN | WEIGHT: 235 LBS | HEART RATE: 68 BPM | OXYGEN SATURATION: 96 % | TEMPERATURE: 98.4 F | DIASTOLIC BLOOD PRESSURE: 74 MMHG | BODY MASS INDEX: 31.83 KG/M2 | RESPIRATION RATE: 16 BRPM | SYSTOLIC BLOOD PRESSURE: 123 MMHG

## 2024-01-18 DIAGNOSIS — E78.1 HYPERTRIGLYCERIDEMIA: ICD-10-CM

## 2024-01-18 DIAGNOSIS — E88.810 METABOLIC SYNDROME: ICD-10-CM

## 2024-01-18 DIAGNOSIS — L20.82 FLEXURAL ECZEMA: ICD-10-CM

## 2024-01-18 DIAGNOSIS — Z00.00 ROUTINE GENERAL MEDICAL EXAMINATION AT A HEALTH CARE FACILITY: Primary | ICD-10-CM

## 2024-01-18 DIAGNOSIS — Z12.5 SCREENING FOR PROSTATE CANCER: ICD-10-CM

## 2024-01-18 DIAGNOSIS — E78.00 HYPERCHOLESTEREMIA: ICD-10-CM

## 2024-01-18 DIAGNOSIS — E66.811 CLASS 1 OBESITY DUE TO EXCESS CALORIES WITH SERIOUS COMORBIDITY AND BODY MASS INDEX (BMI) OF 32.0 TO 32.9 IN ADULT: ICD-10-CM

## 2024-01-18 DIAGNOSIS — E66.09 CLASS 1 OBESITY DUE TO EXCESS CALORIES WITH SERIOUS COMORBIDITY AND BODY MASS INDEX (BMI) OF 32.0 TO 32.9 IN ADULT: ICD-10-CM

## 2024-01-18 DIAGNOSIS — D12.6 SERRATED POLYPOSIS SYNDROME: ICD-10-CM

## 2024-01-18 DIAGNOSIS — L98.9 SKIN LESION: ICD-10-CM

## 2024-01-18 DIAGNOSIS — N52.9 VASCULOGENIC ERECTILE DYSFUNCTION, UNSPECIFIED VASCULOGENIC ERECTILE DYSFUNCTION TYPE: ICD-10-CM

## 2024-01-18 LAB — ERYTHROCYTE [SEDIMENTATION RATE] IN BLOOD BY WESTERGREN METHOD: 10 MM/HR (ref 0–20)

## 2024-01-18 PROCEDURE — 99214 OFFICE O/P EST MOD 30 MIN: CPT | Mod: 25 | Performed by: FAMILY MEDICINE

## 2024-01-18 PROCEDURE — 99396 PREV VISIT EST AGE 40-64: CPT | Performed by: FAMILY MEDICINE

## 2024-01-18 PROCEDURE — 84460 ALANINE AMINO (ALT) (SGPT): CPT | Performed by: FAMILY MEDICINE

## 2024-01-18 PROCEDURE — 36415 COLL VENOUS BLD VENIPUNCTURE: CPT | Performed by: FAMILY MEDICINE

## 2024-01-18 PROCEDURE — 86140 C-REACTIVE PROTEIN: CPT | Performed by: FAMILY MEDICINE

## 2024-01-18 PROCEDURE — 86038 ANTINUCLEAR ANTIBODIES: CPT | Performed by: FAMILY MEDICINE

## 2024-01-18 PROCEDURE — G0103 PSA SCREENING: HCPCS | Performed by: FAMILY MEDICINE

## 2024-01-18 PROCEDURE — 85652 RBC SED RATE AUTOMATED: CPT | Performed by: FAMILY MEDICINE

## 2024-01-18 PROCEDURE — 80048 BASIC METABOLIC PNL TOTAL CA: CPT | Performed by: FAMILY MEDICINE

## 2024-01-18 RX ORDER — TADALAFIL 10 MG/1
TABLET ORAL
Qty: 30 TABLET | Refills: 5 | Status: SHIPPED | OUTPATIENT
Start: 2024-01-18

## 2024-01-18 RX ORDER — TRIAMCINOLONE ACETONIDE 1 MG/G
CREAM TOPICAL 2 TIMES DAILY
Qty: 45 G | Refills: 1 | Status: SHIPPED | OUTPATIENT
Start: 2024-01-18 | End: 2024-04-29

## 2024-01-18 ASSESSMENT — ENCOUNTER SYMPTOMS
HEMATOCHEZIA: 0
CHILLS: 0
PALPITATIONS: 0
WEAKNESS: 0
NAUSEA: 0
HEADACHES: 0
HEARTBURN: 0
EYE PAIN: 0
COUGH: 0
DIZZINESS: 0
SHORTNESS OF BREATH: 0
ARTHRALGIAS: 0
JOINT SWELLING: 0
ABDOMINAL PAIN: 0
FREQUENCY: 0
PARESTHESIAS: 1
SORE THROAT: 0
CONSTIPATION: 0
DIARRHEA: 0
NERVOUS/ANXIOUS: 1
HEMATURIA: 0
DYSURIA: 0
MYALGIAS: 0
FEVER: 0

## 2024-01-18 ASSESSMENT — PATIENT HEALTH QUESTIONNAIRE - PHQ9
SUM OF ALL RESPONSES TO PHQ QUESTIONS 1-9: 5
10. IF YOU CHECKED OFF ANY PROBLEMS, HOW DIFFICULT HAVE THESE PROBLEMS MADE IT FOR YOU TO DO YOUR WORK, TAKE CARE OF THINGS AT HOME, OR GET ALONG WITH OTHER PEOPLE: SOMEWHAT DIFFICULT
SUM OF ALL RESPONSES TO PHQ QUESTIONS 1-9: 5

## 2024-01-18 NOTE — ASSESSMENT & PLAN NOTE
He has noticed of softening of erections over the years.  No specific change.  We discussed and decided to implement a trial of tadalafil.  Anticipate his dose will be 5 or 10 mg per episode.  Consider daily dose 2.5 or 5 mg based on preference.

## 2024-01-18 NOTE — PROGRESS NOTES
"Preventive Care Visit  [facility]  Feliberto Knight MD, Family Medicine  2024      SUBJECTIVE:   Jones is a 58 year old, presenting for the following:  Physical        2024     7:02 AM   Additional Questions   Roomed by xl     Chief Complaint   Patient presents with    Physical     Patient presents for treatment of chronic, comorbid conditions using intensive therapeutic lifestyle interventions including nutrition, physical activity, and behavior management.   -he started on tirzepaitide.    - struggles: hunger.  Nutrition has been much worse since moving to [facility].  Struggled over the holiday. Last dose was in October.    - up 7 pounds by our scale since switching.  His lowest weight at home was <220 lbs.  \"It feels like 15 lbs.\"    - medication benefits: not sure side effects: rash? Shins and antecubital fossa.  Itchy. New detergents.      Healthy Habits:     Getting at least 3 servings of Calcium per day:  Yes    Bi-annual eye exam:  Yes    Dental care twice a year:  Yes    Sleep apnea or symptoms of sleep apnea:  Daytime drowsiness and Excessive snoring    Diet:  Regular (no restrictions)    Frequency of exercise:  1 day/week    Duration of exercise:  Less than 15 minutes    Taking medications regularly:  Yes    Additional concerns today:  No    Today's PHQ-9 Score:       2024     6:49 AM   PHQ-9 SCORE   PHQ-9 Total Score MyChart 5 (Mild depression)   PHQ-9 Total Score 5       Have you ever done Advance Care Planning? (For example, a Health Directive, POLST, or a discussion with a medical provider or your loved ones about your wishes): No, advance care planning information given to patient to review.  Patient plans to discuss their wishes with loved ones or provider.      Social History     Tobacco Use    Smoking status: Former     Types: Cigarettes     Quit date: 1999     Years since quittin.9    Smokeless tobacco: Never   Substance Use Topics    Alcohol use: " "Yes     Comment: Alcoholic Drinks/day: 3-4 drinks per week              1/18/2024     6:52 AM   Alcohol Use   Prescreen: >3 drinks/day or >7 drinks/week? No     Last PSA:   Prostate Specific Antigen Screen   Date Value Ref Range Status   09/06/2022 1.01 0.00 - 3.50 ng/mL Final   08/16/2021 0.96 0.00 - 3.50 ug/L Final     Reviewed orders with patient. Reviewed health maintenance and updated orders accordingly - Yes    Reviewed and updated as needed this visit by clinical staff   Tobacco  Allergies  Meds  Problems  Med Hx  Surg Hx  Fam Hx          Reviewed and updated as needed this visit by Provider   Tobacco  Allergies  Meds  Problems  Med Hx  Surg Hx  Fam Hx            OBJECTIVE:   /74 (BP Location: Left arm, Patient Position: Sitting, Cuff Size: Adult Large)   Pulse 68   Temp 98.4  F (36.9  C) (Oral)   Resp 16   Ht 1.816 m (5' 11.5\")   Wt 106.6 kg (235 lb)   SpO2 96%   BMI 32.32 kg/m     Estimated body mass index is 32.32 kg/m  as calculated from the following:    Height as of this encounter: 1.816 m (5' 11.5\").    Weight as of this encounter: 106.6 kg (235 lb).  Review of Systems   Constitutional:  Negative for chills and fever.   HENT:  Positive for hearing loss. Negative for congestion, ear pain and sore throat.    Eyes:  Negative for pain and visual disturbance.   Respiratory:  Negative for cough and shortness of breath.    Cardiovascular:  Negative for chest pain and palpitations.   Gastrointestinal:  Negative for abdominal pain, constipation, diarrhea and nausea.   Genitourinary:  Negative for dysuria, frequency, genital sores, hematuria, penile discharge and urgency.   Musculoskeletal:  Negative for arthralgias, joint swelling and myalgias.   Skin:  Positive for rash.   Neurological:  Negative for dizziness, weakness and headaches.   Psychiatric/Behavioral:  The patient is nervous/anxious.        Physical Exam  Vitals reviewed.   Constitutional:       General: He is not in acute " distress.     Appearance: Normal appearance. He is obese. He is not ill-appearing.   HENT:      Head: Normocephalic and atraumatic.      Right Ear: External ear normal.      Left Ear: External ear normal.      Nose: Nose normal.      Mouth/Throat:      Pharynx: Oropharynx is clear. No oropharyngeal exudate or posterior oropharyngeal erythema.   Eyes:      General: No scleral icterus.        Right eye: No discharge.         Left eye: No discharge.      Extraocular Movements: Extraocular movements intact.      Conjunctiva/sclera: Conjunctivae normal.      Pupils: Pupils are equal, round, and reactive to light.   Neck:      Comments: No thyromegaly.  Cardiovascular:      Rate and Rhythm: Normal rate and regular rhythm.      Heart sounds: Normal heart sounds. No murmur heard.     No friction rub. No gallop.   Pulmonary:      Effort: Pulmonary effort is normal. No respiratory distress.      Breath sounds: Normal breath sounds. No wheezing or rales.   Abdominal:      General: There is no distension.      Palpations: Abdomen is soft. There is no mass.      Tenderness: There is no abdominal tenderness.   Musculoskeletal:         General: No signs of injury. Normal range of motion.      Cervical back: Normal range of motion.      Right lower leg: No edema.      Left lower leg: No edema.   Lymphadenopathy:      Cervical: No cervical adenopathy.   Skin:     General: Skin is warm.      Coloration: Skin is not jaundiced.      Findings: No rash.      Comments: Left shin: Irregularly shaped raised slightly scaly plaques across the left shin.  Similar lesions in the left antecubital fossa.    Left cheek: 4 x 2 mm raised shiny lesion with out discoloration or pigmentation.   Neurological:      General: No focal deficit present.      Mental Status: He is alert and oriented to person, place, and time.      Cranial Nerves: No cranial nerve deficit.      Deep Tendon Reflexes: Reflexes normal.   Psychiatric:         Mood and Affect: Mood  normal.         The 10-year ASCVD risk score (Vernon STACY, et al., 2019) is: 7%    Values used to calculate the score:      Age: 58 years      Sex: Male      Is Non- : No      Diabetic: No      Tobacco smoker: No      Systolic Blood Pressure: 123 mmHg      Is BP treated: No      HDL Cholesterol: 46 mg/dL      Total Cholesterol: 177 mg/dL      Diagnostic Test Results:  Labs reviewed in Epic    ASSESSMENT/PLAN:     Routine general medical examination at a health care facility  He will complete shingles vaccine at the pharmacy.  No obstructive symptoms of the lower urinary tract.  He has a known prostate nodule.  We will continue our practice of checking PSA.  Skin, weight and men's health concerns as discussed elsewhere.     Class 1 obesity due to excess calories with serious comorbidity and body mass index (BMI) of 32.0 to 32.9 in adult  58 year old year old male in clinic today to discuss treatment of the following conditions through diet and lifestyle modification and weight loss:  1. Routine general medical examination at a health care facility    2. Metabolic syndrome    3. Hypertriglyceridemia    4. Hypercholesteremia    5. Class 1 obesity due to excess calories with serious comorbidity and body mass index (BMI) of 32.0 to 32.9 in adult    6. Flexural eczema    7. Screening for prostate cancer    8. Vasculogenic erectile dysfunction, unspecified vasculogenic erectile dysfunction type    9. Skin lesion      The patient's weight loss result since the last visit was unsuccessful based on weight regain.  He has done well on GLP-1 receptor agonists in general.  However he has done poorly with approximately 10 pounds of weight regain since transitioning from tirzepatide to semaglutide.  He was on tirzepatide using a [employer] coupon and lost the majority of weight he lost in 2020 2-2023.  Since moving over to [facility] he has been struggling with hunger and cravings.  Now that tirzepatide has  "been approved for weight loss under a brand of Zepbound he would like to transition back to the medicine that was superior.  - I think he has had an adequate trial of Wegovy.  Given his weight gain I agree that an alternative is appropriate.  - Resume tirzepatide.  Initial dose will be 10 mg.  We reviewed potential side effects.  I anticipate his maintenance dose will be either 12.5 mg/week or 15 mg/week.  - Continue to focus on protein/vegetable rich diets.  - Physical activity: Agree with cardiovascular exercise that he endeavors to do.  I suggested he add strength training as well.  - Follow-up in 3-6 months..       Hypercholesteremia  10-year ASCVD risk score is 7%.  We reviewed this calculation and the \"risk factor\" of age.  For now, no statin therapy.    Serrated polyposis syndrome  He is up-to-date with colon cancer screening recommendations.  Repeat recommended for 2026.    Flexural eczema  The patient has been struggling with eczematous plaques over the past few months.  He wonders if it might be related to semaglutide as the symptoms started as he transitioned from tirzepatide to semaglutide.  They are itchy and appear in a distribution that would be consistent with eczema.  He is looking for environmental factors that might contribute as he does have a history of sensitive skin.  We are switching away from semaglutide.  We will treat the plaques with triamcinolone (consider more potent therapy if not resolving).    Skin lesion  Circular smooth lesion of left cheek which has some features consistent with basal cell carcinoma.  Referral placed for dermatology for review of this particular lesion.  The patient lives in [place].  Referral to [facility] if this is a local option for him.    Vasculogenic erectile dysfunction, unspecified vasculogenic erectile dysfunction type  He has noticed of softening of erections over the years.  No specific change.  We discussed and decided to " implement a trial of tadalafil.  Anticipate his dose will be 5 or 10 mg per episode.  Consider daily dose 2.5 or 5 mg based on preference.        Patient has been advised of split billing requirements and indicates understanding: Yes      Counseling  Reviewed preventive health counseling, as reflected in patient instructions       Regular exercise       Healthy diet/nutrition        He reports that he quit smoking about 24 years ago. His smoking use included cigarettes. He has never used smokeless tobacco.            Signed Electronically by: Feliberto Knight MD  Answers submitted by the patient for this visit:  Patient Health Questionnaire (Submitted on 1/18/2024)  If you checked off any problems, how difficult have these problems made it for you to do your work, take care of things at home, or get along with other people?: Somewhat difficult  PHQ9 TOTAL SCORE: 5  Annual Preventive Visit (Submitted on 1/18/2024)  Chief Complaint: Annual Exam:  Blood in stool: No  heartburn: No  peripheral edema: No  mood changes: No  Skin sensation changes: Yes  impotence: Yes

## 2024-01-18 NOTE — ASSESSMENT & PLAN NOTE
"10-year ASCVD risk score is 7%.  We reviewed this calculation and the \"risk factor\" of age.  For now, no statin therapy.  "

## 2024-01-18 NOTE — ASSESSMENT & PLAN NOTE
58 year old year old male in clinic today to discuss treatment of the following conditions through diet and lifestyle modification and weight loss:  1. Routine general medical examination at a health care facility    2. Metabolic syndrome    3. Hypertriglyceridemia    4. Hypercholesteremia    5. Class 1 obesity due to excess calories with serious comorbidity and body mass index (BMI) of 32.0 to 32.9 in adult    6. Flexural eczema    7. Screening for prostate cancer    8. Vasculogenic erectile dysfunction, unspecified vasculogenic erectile dysfunction type    9. Skin lesion      The patient's weight loss result since the last visit was unsuccessful based on weight regain.  He has done well on GLP-1 receptor agonists in general.  However he has done poorly with approximately 10 pounds of weight regain since transitioning from tirzepatide to semaglutide.  He was on tirzepatide using a [employer] coupon and lost the majority of weight he lost in 2020 2-2023.  Since moving over to Wegovy he has been struggling with hunger and cravings.  Now that tirzepatide has been approved for weight loss under a brand of Zepbound he would like to transition back to the medicine that was superior.  - I think he has had an adequate trial of Wegovy.  Given his weight gain I agree that an alternative is appropriate.  - Resume tirzepatide.  Initial dose will be 10 mg.  We reviewed potential side effects.  I anticipate his maintenance dose will be either 12.5 mg/week or 15 mg/week.  - Continue to focus on protein/vegetable rich diets.  - Physical activity: Agree with cardiovascular exercise that he endeavors to do.  I suggested he add strength training as well.  - Follow-up in 3-6 months..

## 2024-01-18 NOTE — ASSESSMENT & PLAN NOTE
The patient has been struggling with eczematous plaques over the past few months.  He wonders if it might be related to semaglutide as the symptoms started as he transitioned from tirzepatide to semaglutide.  They are itchy and appear in a distribution that would be consistent with eczema.  He is looking for environmental factors that might contribute as he does have a history of sensitive skin.  We are switching away from semaglutide.  We will treat the plaques with triamcinolone (consider more potent therapy if not resolving).

## 2024-01-18 NOTE — TELEPHONE ENCOUNTER
.Prior Authorization Retail Medication Request    Medication/Dose: Zepbound 10mg/0.5ml requires a Prior Auth  Diagnosis and ICD code (if different than what is on RX):  N/A  New/renewal/insurance change PA/secondary ins. PA:  Previously Tried and Failed:  N/A  Rationale:  N/A    Insurance   [facility]  Insurance ID:  [id number]    Secondary (if applicable):N/A  Insurance ID:  N/A    Pharmacy Information (if different than what is on RX)  Name:  [facility]  Phone:  [phone]  Fax:[phone]

## 2024-01-18 NOTE — ASSESSMENT & PLAN NOTE
Circular smooth lesion of left cheek which has some features consistent with basal cell carcinoma.  Referral placed for dermatology for review of this particular lesion.  The patient lives in [place].  Referral to [facility] if this is a local option for him.

## 2024-01-18 NOTE — ASSESSMENT & PLAN NOTE
He will complete shingles vaccine at the pharmacy.  No obstructive symptoms of the lower urinary tract.  He has a known prostate nodule.  We will continue our practice of checking PSA.  Skin, weight and men's health concerns as discussed elsewhere.

## 2024-01-19 LAB
ALT SERPL W P-5'-P-CCNC: 23 U/L (ref 0–70)
ANA SER QL IF: NEGATIVE
ANION GAP SERPL CALCULATED.3IONS-SCNC: 9 MMOL/L (ref 7–15)
BUN SERPL-MCNC: 15.5 MG/DL (ref 8–23)
CALCIUM SERPL-MCNC: 9.3 MG/DL (ref 8.6–10)
CHLORIDE SERPL-SCNC: 103 MMOL/L (ref 98–107)
CREAT SERPL-MCNC: 0.97 MG/DL (ref 0.67–1.17)
CRP SERPL-MCNC: <3 MG/L
DEPRECATED HCO3 PLAS-SCNC: 29 MMOL/L (ref 22–29)
EGFRCR SERPLBLD CKD-EPI 2021: 90 ML/MIN/1.73M2
GLUCOSE SERPL-MCNC: 91 MG/DL (ref 70–99)
POTASSIUM SERPL-SCNC: 4.7 MMOL/L (ref 3.4–5.3)
PSA SERPL DL<=0.01 NG/ML-MCNC: 1.05 NG/ML (ref 0–3.5)
SODIUM SERPL-SCNC: 141 MMOL/L (ref 135–145)

## 2024-01-28 NOTE — TELEPHONE ENCOUNTER
Retail Pharmacy Prior Authorization Team   Phone: [phone]    PA Initiation    Medication: ZEPBOUND 10 MG/0.5ML SC SOAJ  Insurance Company: [employer] - Phone [phone] Fax [phone]  Pharmacy Filling the Rx: [place], [place] - [address]  Filling Pharmacy Phone: [phone]  Filling Pharmacy Fax:    Start Date: 1/28/2024

## 2024-01-29 ENCOUNTER — MYC MEDICAL ADVICE (OUTPATIENT)
Dept: FAMILY MEDICINE | Facility: CLINIC | Age: 59
End: 2024-01-29
Payer: COMMERCIAL

## 2024-01-29 DIAGNOSIS — E66.09 CLASS 1 OBESITY DUE TO EXCESS CALORIES WITH SERIOUS COMORBIDITY AND BODY MASS INDEX (BMI) OF 30.0 TO 30.9 IN ADULT: ICD-10-CM

## 2024-01-29 DIAGNOSIS — E78.1 HYPERTRIGLYCERIDEMIA: ICD-10-CM

## 2024-01-29 DIAGNOSIS — E88.810 METABOLIC SYNDROME: ICD-10-CM

## 2024-01-29 DIAGNOSIS — E78.00 HYPERCHOLESTEREMIA: ICD-10-CM

## 2024-01-29 DIAGNOSIS — E66.811 CLASS 1 OBESITY DUE TO EXCESS CALORIES WITH SERIOUS COMORBIDITY AND BODY MASS INDEX (BMI) OF 30.0 TO 30.9 IN ADULT: ICD-10-CM

## 2024-01-29 NOTE — TELEPHONE ENCOUNTER
PRIOR AUTHORIZATION DENIED    Medication: ZEPBOUND 10 MG/0.5ML SC SOAJ  Insurance Company: [employer] - Phone [phone] Fax [phone]  Denial Date: 1/29/2024  Denial Reason(s):           Appeal Information:         Patient Notified: No

## 2024-01-29 NOTE — LETTER
"February 10, 2024      Jones Miller  [address]  [facility] [address]      To Whom It May Concern,    I am writing on behalf of my patient, Jones Miller  to document the medical necessity of Zepbound for the treatment of obesity (BMI at least 30 kg/m2). This letter provides information about the patient's medical history and diagnosis and a statement summarizing my treatment rationale.     Summary of Patient History and Diagnosis  Jones Miller is a 58 year old male with a diagnosis of obesity (BMI at least 30 kg/m2).  He has comorbidities related to weight including hypercholesterolemia, hypertriglyceridemia.    Estimated body mass index is 32.32 kg/m  as calculated from the following:    Height as of 24: 1.816 m (5' 11.5\").    Weight as of 24: 106.6 kg (235 lb).    Wt Readings from Last 4 Encounters:   24 106.6 kg (235 lb)   10/20/23 103.7 kg (228 lb 11.2 oz)   23 101.8 kg (224 lb 6 oz)   10/18/22 113.6 kg (250 lb 6.4 oz)         Treatment Rationale  Despite lifestyle/health improvements with nutrition, exercise, and behavioral changes since , Jones has been unable to achieve significant and sustainable weight loss.     Previous attempts with the below medications were unsuccessful due to intolerable side effects and/or are contraindicated:   Phentermine and diethylpropion in  and .  At that time he had emotional side effects including emotional lability and increased anxiety.  Tirzepatide (through brand of Mounjaro) in [id number] with resulting weight loss.  This was not sustainable after his manufactures coupon .  Minimal side effects  Wegovy: Started as a replacement for tirzepatide.  Worsening cravings and hunger.  Increased portions.  Weight regain of at least 10 pounds while on this therapy as shown on the graph above. .    Zepbound (tirzepatide) is an FDA-approved medication for chronic weight management in adults with a BMI of 30 or higher or a BMI of 27 or " higher with weight-related comorbidity. Jones's BMI qualifies them for Zepbound, which has shown remarkable efficacy and a favorable safety profile. Patient has no history of pancreatitis. Jones has no personal or family history of medullary thyroid carcinoma or MEN2. Zepbound is the only medication in its class as a Glucagon-like peptide-1 (GLP-1) and glucose-dependent insulinotropic polypeptide (GIP) agonist being used for weight management.     Jones's unsuccessful weight management attempts and previous medication failures highlight the need for Zepbound as a medically necessary treatment option. Denying coverage would hinder Jones's progress and increase the risk of obesity-related health conditions.    I kindly request that you review Jones's case and reconsider coverage for Zepbound as an integral part of their obesity treatment plan.     Duration  12 months     Summary  In summary, Zepbound is medically necessary for this patient s medical condition. Please call my office at [phone] if I can provide you with any additional information to approve my request. I look forward to receiving your timely response and approval of this request.             Sincerely,        Feliberto Knight MD

## 2024-02-12 NOTE — TELEPHONE ENCOUNTER
Medication Appeal Initiation    Medication: ZEPBOUND 10 MG/0.5ML SC SOAJ  Appeal Start Date:  2/12/2024   Insurance Phone:  Comments:  Appeal and denial letter faxed

## 2024-02-15 NOTE — TELEPHONE ENCOUNTER
MEDICATION APPEAL DENIED    Medication: ZEPBOUND 10 MG/0.5ML SC SOAJ  Insurance Company:   Denial Date: 2/15/2024  Denial Reason(s):         Second Level Appeal Information:           Patient Notified: No    Central Prior Authorization Team ONLY: Second level appeals will be managed by the clinic staff and provider. Please contact the [facility] Prior Authorization Team if additional information about the denial is needed.

## 2024-02-16 NOTE — CONFIDENTIAL NOTE
"  The appeal was denied.  The letter starts to explain the \"reasons for the decision\" but then cuts off.  How can I find the full document?  "

## 2024-02-19 NOTE — TELEPHONE ENCOUNTER
MEDICATION APPEAL DENIED    Medication: ZEPBOUND 10 MG/0.5ML SC SOAJ  Insurance Company:   Denial Date: 2/15/2024  Denial Reason(s):         Second Level Appeal Information:

## 2024-02-20 ENCOUNTER — MYC MEDICAL ADVICE (OUTPATIENT)
Dept: FAMILY MEDICINE | Facility: CLINIC | Age: 59
End: 2024-02-20
Payer: COMMERCIAL

## 2024-02-21 NOTE — TELEPHONE ENCOUNTER
Patient's PA was denied. Decision letter available in 1/18 PA encounter. Please advise if appeal needs to be initiated.

## 2024-02-22 NOTE — TELEPHONE ENCOUNTER
Appeal has already been denied, see PA encounter dated 1/18/24.     Would you like to addend letter at all before a 2nd level appeal is initiated?

## 2024-03-12 ENCOUNTER — MYC MEDICAL ADVICE (OUTPATIENT)
Dept: FAMILY MEDICINE | Facility: CLINIC | Age: 59
End: 2024-03-12
Payer: COMMERCIAL

## 2024-03-12 DIAGNOSIS — E66.09 CLASS 1 OBESITY DUE TO EXCESS CALORIES WITH SERIOUS COMORBIDITY AND BODY MASS INDEX (BMI) OF 32.0 TO 32.9 IN ADULT: Primary | ICD-10-CM

## 2024-03-12 DIAGNOSIS — E66.811 CLASS 1 OBESITY DUE TO EXCESS CALORIES WITH SERIOUS COMORBIDITY AND BODY MASS INDEX (BMI) OF 32.0 TO 32.9 IN ADULT: Primary | ICD-10-CM

## 2024-03-12 DIAGNOSIS — E88.810 METABOLIC SYNDROME: ICD-10-CM

## 2024-03-12 NOTE — TELEPHONE ENCOUNTER
Call placed to [employer] to check status of second level appeal. They report second level appeal has been approved. Prior authorization will  24.    Detailed message left for patient.

## 2024-03-18 NOTE — TELEPHONE ENCOUNTER
PA team updated PA encounter earlier today:   Lacey Lyle   Pharmacy is still unable to get a paid claim on this- I followed up with insurance and the Appeal was approved but the Coverage Exception was not- They are re-opening this to process it correctly. They will be faxing determination in 24 hours.          If [employer] approves coverage exception, please advise on patient's dosage question.

## 2024-03-18 NOTE — TELEPHONE ENCOUNTER
Pharmacy is still unable to get a paid claim on this- I followed up with insurance and the Appeal was approved but the Coverage Exception was not- They are re-opening this to process it correctly. They will be faxing determination in 24 hours.  Thank You!  Madonna Lyle [facility]  Prior Auth Specialist

## 2024-03-21 ENCOUNTER — ALLIED HEALTH/NURSE VISIT (OUTPATIENT)
Dept: FAMILY MEDICINE | Facility: CLINIC | Age: 59
End: 2024-03-21
Payer: COMMERCIAL

## 2024-03-21 VITALS — BODY MASS INDEX: 33.41 KG/M2 | WEIGHT: 242.9 LBS

## 2024-03-21 DIAGNOSIS — E66.09 CLASS 1 OBESITY DUE TO EXCESS CALORIES WITH SERIOUS COMORBIDITY AND BODY MASS INDEX (BMI) OF 32.0 TO 32.9 IN ADULT: Primary | ICD-10-CM

## 2024-03-21 DIAGNOSIS — E66.811 CLASS 1 OBESITY DUE TO EXCESS CALORIES WITH SERIOUS COMORBIDITY AND BODY MASS INDEX (BMI) OF 32.0 TO 32.9 IN ADULT: Primary | ICD-10-CM

## 2024-03-21 PROCEDURE — 99207 PR NO CHARGE NURSE ONLY: CPT

## 2024-03-21 NOTE — PROGRESS NOTES
Patient presents to clinic for weight check, see below.  Wt 110.2 kg (242 lb 14.4 oz)   BMI 33.41 kg/m

## 2024-04-12 ENCOUNTER — MYC REFILL (OUTPATIENT)
Dept: FAMILY MEDICINE | Facility: CLINIC | Age: 59
End: 2024-04-12
Payer: COMMERCIAL

## 2024-04-12 DIAGNOSIS — E66.811 CLASS 1 OBESITY DUE TO EXCESS CALORIES WITH SERIOUS COMORBIDITY AND BODY MASS INDEX (BMI) OF 32.0 TO 32.9 IN ADULT: ICD-10-CM

## 2024-04-12 DIAGNOSIS — E88.810 METABOLIC SYNDROME: ICD-10-CM

## 2024-04-12 DIAGNOSIS — E66.09 CLASS 1 OBESITY DUE TO EXCESS CALORIES WITH SERIOUS COMORBIDITY AND BODY MASS INDEX (BMI) OF 32.0 TO 32.9 IN ADULT: ICD-10-CM

## 2024-04-29 ENCOUNTER — OFFICE VISIT (OUTPATIENT)
Dept: FAMILY MEDICINE | Facility: CLINIC | Age: 59
End: 2024-04-29
Payer: COMMERCIAL

## 2024-04-29 VITALS
DIASTOLIC BLOOD PRESSURE: 80 MMHG | HEIGHT: 72 IN | OXYGEN SATURATION: 96 % | BODY MASS INDEX: 32.65 KG/M2 | SYSTOLIC BLOOD PRESSURE: 117 MMHG | WEIGHT: 241.06 LBS | HEART RATE: 68 BPM | TEMPERATURE: 98.6 F | RESPIRATION RATE: 16 BRPM

## 2024-04-29 DIAGNOSIS — L29.89 PRURITIC ERYTHEMATOUS RASH: Primary | ICD-10-CM

## 2024-04-29 PROCEDURE — 99213 OFFICE O/P EST LOW 20 MIN: CPT

## 2024-04-29 RX ORDER — PREDNISONE 20 MG/1
40 TABLET ORAL DAILY
Qty: 10 TABLET | Refills: 0 | Status: SHIPPED | OUTPATIENT
Start: 2024-04-29 | End: 2024-05-04

## 2024-04-29 RX ORDER — CETIRIZINE HYDROCHLORIDE 10 MG/1
10 TABLET ORAL DAILY
Qty: 30 TABLET | Refills: 0 | Status: SHIPPED | OUTPATIENT
Start: 2024-04-29 | End: 2024-06-04

## 2024-04-29 RX ORDER — CLOBETASOL PROPIONATE 0.5 MG/G
CREAM TOPICAL 2 TIMES DAILY
Qty: 60 G | Refills: 0 | Status: SHIPPED | OUTPATIENT
Start: 2024-04-29 | End: 2024-06-04

## 2024-04-29 NOTE — ASSESSMENT & PLAN NOTE
Patient presents today to discuss ongoing issues with an itchy, erythematous rash. He was seen for this and symptoms were thought to be an eczema-like presentation. Interestingly, he had essentially no response to triamcinolone although it sounds like he under-dosed it and did not use it for longer than a few days as there was not enough medication provided. The distribution somewhat follows an eczematous pattern. Does not have any plaques. He utilizes sensitive skin products. No new medications, products or exposures. In some other areas, such as his back and upper extremities, it has the appearance of urticaria. Given the degree of itching, will have him do 5 days of prednisone in combination with cetirizine. Expected therapeutic effects and potential side effects discussed. If he has no response to this treatment, I'd like him to step up therapy with clobetasol to the lesions twice daily for a week. If symptoms persist despite this, or worsen in any way, would recommend evaluation by dermatology. Patient expressed understanding of and agreement with this plan. All questions were answered.

## 2024-04-29 NOTE — PROGRESS NOTES
Assessment & Plan   Problem List Items Addressed This Visit       Pruritic erythematous rash - Primary     Patient presents today to discuss ongoing issues with an itchy, erythematous rash. He was seen for this and symptoms were thought to be an eczema-like presentation. Interestingly, he had essentially no response to triamcinolone although it sounds like he under-dosed it and did not use it for longer than a few days as there was not enough medication provided. The distribution somewhat follows an eczematous pattern. Does not have any plaques. He utilizes sensitive skin products. No new medications, products or exposures. In some other areas, such as his back and upper extremities, it has the appearance of urticaria. Given the degree of itching, will have him do 5 days of prednisone in combination with cetirizine. Expected therapeutic effects and potential side effects discussed. If he has no response to this treatment, I'd like him to step up therapy with clobetasol to the lesions twice daily for a week. If symptoms persist despite this, or worsen in any way, would recommend evaluation by dermatology. Patient expressed understanding of and agreement with this plan. All questions were answered.         Relevant Medications    predniSONE (DELTASONE) 20 MG tablet    cetirizine (ZYRTEC) 10 MG tablet    clobetasol propionate (TEMOVATE) 0.05 % external cream      Chad Goldman is a 58 year old, presenting for the following health issues:  Rash (Legs, arms and back. Sx started last November 2023. Comes and goes-gets worse after being in hot tub. Hydrocortisone ointment  helps.)      4/29/2024    10:56 AM   Additional Questions   Roomed by sac   Accompanied by self         4/29/2024    10:56 AM   Patient Reported Additional Medications   Patient reports taking the following new medications no     Ongoing skin rash  Started last fall  Having red bumps sporadically throughout body.   Was treated with Kenalog; didn't  "notice a significant improvement. He only used this for a few days because there was a very small amount prescribed.  Itchy. Will spread with scratching. Will be painful with scratching.   He denies any changes to products (utilizes low scent detergent), no new medications.  Last \"bad\" flareup was after using his hot tub. His hot tub is chlorine free. No one else had similar rash however.     History of Present Illness       Reason for visit:  Persistant skin rash    He eats 2-3 servings of fruits and vegetables daily.He consumes 0 sweetened beverage(s) daily.He exercises with enough effort to increase his heart rate 20 to 29 minutes per day.  He exercises with enough effort to increase his heart rate 6 days per week.   He is taking medications regularly.         Objective    There were no vitals taken for this visit.  There is no height or weight on file to calculate BMI.    Physical Exam  Vitals and nursing note reviewed.   Constitutional:       Appearance: Normal appearance.   Cardiovascular:      Rate and Rhythm: Normal rate and regular rhythm.   Pulmonary:      Effort: Pulmonary effort is normal. No respiratory distress.   Skin:     General: Skin is warm.      Findings: Rash present.      Comments: Scattered, raised and erythematous lesions. Some with irregular borders, others circular wheals. Present to bilateral lower extremities, scattered generalized throughout back as well as to left upper extremity.    Neurological:      Mental Status: He is alert.   Psychiatric:         Behavior: Behavior normal.         Thought Content: Thought content normal.        Scattered, raised and erythematous lesions. Some with irregular borders, others circular wheals. Present to bilateral lower extremities, scattered generalized throughout back as well as to left upper extremity.           Signed Electronically by: NORA Trejo CNP    "

## 2024-05-06 ENCOUNTER — OFFICE VISIT (OUTPATIENT)
Dept: DERMATOLOGY | Facility: CLINIC | Age: 59
End: 2024-05-06
Attending: FAMILY MEDICINE
Payer: COMMERCIAL

## 2024-05-06 DIAGNOSIS — L82.1 SK (SEBORRHEIC KERATOSIS): Primary | ICD-10-CM

## 2024-05-06 DIAGNOSIS — L98.9 SKIN LESION: ICD-10-CM

## 2024-05-06 DIAGNOSIS — L82.0 INFLAMED SEBORRHEIC KERATOSIS: ICD-10-CM

## 2024-05-06 DIAGNOSIS — D23.9 DERMAL NEVUS: ICD-10-CM

## 2024-05-06 DIAGNOSIS — D18.01 ANGIOMA OF SKIN: ICD-10-CM

## 2024-05-06 DIAGNOSIS — L30.9 DERMATITIS: ICD-10-CM

## 2024-05-06 DIAGNOSIS — L81.4 LENTIGO: ICD-10-CM

## 2024-05-06 PROCEDURE — 99243 OFF/OP CNSLTJ NEW/EST LOW 30: CPT | Performed by: DERMATOLOGY

## 2024-05-06 RX ORDER — BETAMETHASONE DIPROPIONATE 0.5 MG/G
CREAM TOPICAL
Qty: 300 G | Refills: 3 | Status: SHIPPED | OUTPATIENT
Start: 2024-05-06 | End: 2024-06-04

## 2024-05-06 ASSESSMENT — PAIN SCALES - GENERAL: PAINLEVEL: NO PAIN (0)

## 2024-05-06 NOTE — NURSING NOTE
Jones Miller's chief complaint for this visit includes:  Chief Complaint   Patient presents with    Derm Problem     Patient is concerned about spot on cheek and rash       PCP: Feliberto Knight    Referring Provider:  Liudmila Rogers  [facility] [place] [address]    There were no vitals taken for this visit.  No Pain (0)      No Known Allergies      Do you need any medication refills at today's visit? No    Carolyn Chase MA

## 2024-05-06 NOTE — PATIENT INSTRUCTIONS
2 Claritin in AM  2 Zyrtec at bedtime   Topical cream      Patient Education       Proper skin care from [facility]:    -Eliminate harsh soaps as they strip the natural oils from the skin, often resulting in dry itchy skin ( i.e. Dial, Zest, Peggy Spring)  -Use mild soaps such as Cetaphil or Dove Sensitive Skin in the shower. You do not need to use soap on arms, legs, and trunk every time you shower unless visibly soiled.   -Avoid hot or cold showers.  -After showering, lightly dry off and apply moisturizing within 2-3 minutes. This will help trap moisture in the skin.   -Aggressive use of a moisturizer at least 1-2 times a day to the entire body (including -Vanicream, Cetaphil, Aquaphor or Cerave) and moisturize hands after every washing.  -We recommend using moisturizers that come in a tub that needs to be scooped out, not a pump. This has more of an oil base. It will hold moisture in your skin much better than a water base moisturizer. The above recommended are non-pore clogging.      Wear a sunscreen with at least SPF 30 on your face, ears, neck and V of the chest daily. Wear sunscreen on other areas of the body if those areas are exposed to the sun throughout the day. Sunscreens can contain physical and/or chemical blockers. Physical blockers are less likely to clog pores, these include zinc oxide and titanium dioxide. Reapply every two hour and after swimming.     Sunscreen examples: https://www.ewg.org/sunscreen/    UV radiation  UVA radiation remains constant throughout the day and throughout the year. It is a longer wavelength than UVB and therefore penetrates deeper into the skin leading to immediate and delayed tanning, photoaging, and skin cancer. 70-80% of UVA and UVB radiation occurs between the hours of 10am-2pm.  UVB radiation  UVB radiation causes the most harmful effects and is more significant during the summer months. However, snow and ice can reflect UVB radiation leading to skin  damage during the winter months as well. UVB radiation is responsible for tanning, burning, inflammation, delayed erythema (pinkness), pigmentation (brown spots), and skin cancer.     I recommend self monthly full body exams and yearly full body exams with a dermatology provider. If you develop a new or changing lesion please follow up for examination. Most skin cancers are pink and scaly or pink and pearly. However, we do see blue/brown/black skin cancers.  Consider the ABCDEs of melanoma when giving yourself your monthly full body exam ( don't forget the groin, buttocks, feet, toes, etc). A-asymmetry, B-borders, C-color, D-diameter, E-elevation or evolving. If you see any of these changes please follow up in clinic. If you cannot see your back I recommend purchasing a hand held mirror to use with a larger wall mirror.       Checking for Skin Cancer  You can find cancer early by checking your skin each month. There are 3 kinds of skin cancer. They are melanoma, basal cell carcinoma, and squamous cell carcinoma. Doing monthly skin checks is the best way to find new marks or skin changes. Follow the instructions below for checking your skin.   The ABCDEs of checking moles for melanoma   Check your moles or growths for signs of melanoma using ABCDE:   Asymmetry: the sides of the mole or growth don t match  Border: the edges are ragged, notched, or blurred  Color: the color within the mole or growth varies  Diameter: the mole or growth is larger than 6 mm (size of a pencil eraser)  Evolving: the size, shape, or color of the mole or growth is changing (evolving is not shown in the images below)    Checking for other types of skin cancer  Basal cell carcinoma or squamous cell carcinoma have symptoms such as:     A spot or mole that looks different from all other marks on your skin  Changes in how an area feels, such as itching, tenderness, or pain  Changes in the skin's surface, such as oozing, bleeding, or scaliness  A  sore that does not heal  New swelling or redness beyond the border of a mole    Who s at risk?  Anyone can get skin cancer. But you are at greater risk if you have:   Fair skin, light-colored hair, or light-colored eyes  Many moles or abnormal moles on your skin  A history of sunburns from sunlight or tanning beds  A family history of skin cancer  A history of exposure to radiation or chemicals  A weakened immune system  If you have had skin cancer in the past, you are at risk for recurring skin cancer.   How to check your skin  Do your monthly skin checkups in front of a full-length mirror. Check all parts of your body, including your:   Head (ears, face, neck, and scalp)  Torso (front, back, and sides)  Arms (tops, undersides, upper, and lower armpits)  Hands (palms, backs, and fingers, including under the nails)  Buttocks and genitals  Legs (front, back, and sides)  Feet (tops, soles, toes, including under the nails, and between toes)  If you have a lot of moles, take digital photos of them each month. Make sure to take photos both up close and from a distance. These can help you see if any moles change over time.   Most skin changes are not cancer. But if you see any changes in your skin, call your doctor right away. Only he or she can diagnose a problem. If you have skin cancer, seeing your doctor can be the first step toward getting the treatment that could save your life.   [employer] last reviewed this educational content on 4/1/2019 2000-2020 [employer]. [address] [place] [address], [place] [address]. All rights reserved. This information is not intended as a substitute for professional medical care. Always follow your healthcare professional's instructions.       When should I call my doctor?  If you are worsening or not improving, please, contact us or seek urgent care as noted below.     Who should I call with questions (adults)?  [facility] (adult and  pediatric): [phone]  [facility] (adult): [phone]  [facility] ([place], [place], [place] and [place]) [phone]  For urgent needs outside of business hours call the [facility] at [phone] and ask for the dermatology resident on call to be paged  If this is a medical emergency and you are unable to reach an ER, Call [phone]      If you need a prescription refill, please contact your pharmacy. Refills are approved or denied by our Physicians during normal business hours, Monday through Fridays  Per office policy, refills will not be granted if you have not been seen within the past year (or sooner depending on your child's condition)

## 2024-05-06 NOTE — PROGRESS NOTES
Jones Miller , a 58 year old year old male patient, I was asked to see by Dr. Knight for spot on cheek and leg and rash.  USing ivory soap.  Very itchy, given cloebtasol but has not used.  Patient has no other skin complaints today.  Remainder of the HPI, Meds, PMH, Allergies, FH, and SH was reviewed in chart.      Past Medical History:   Diagnosis Date    Anxiety        Past Surgical History:   Procedure Laterality Date    COLONOSCOPY      PROSTATE BIOPSY      TONSILLECTOMY      VASECTOMY          Family History   Problem Relation Age of Onset    Chronic Obstructive Pulmonary Disease Mother     Obesity Mother     Parkinsonism Father     Depression Father     Diabetes Father     Anxiety Disorder Father     Substance Abuse Father     Colon Cancer Sister         Pre-cancer, twice annual colonoscopies    Substance Abuse Sister     Obesity Sister     Other Cancer Sister         Ovarian - Recently treated [facility] I genetic marker    No Known Problems Brother     No Known Problems Maternal Grandmother     No Known Problems Maternal Grandfather     Diabetes Paternal Grandmother     No Known Problems Paternal Grandfather     No Known Problems Daughter     No Known Problems Son     Breast Cancer Paternal Aunt     Breast Cancer Paternal Aunt     Prostate Cancer No family hx of     Thyroid Cancer No family hx of     Coronary Artery Disease No family hx of        Social History     Socioeconomic History    Marital status:      Spouse name: Not on file    Number of children: Not on file    Years of education: Not on file    Highest education level: Not on file   Occupational History    Not on file   Tobacco Use    Smoking status: Former     Current packs/day: 0.00     Types: Cigarettes     Quit date: 1999     Years since quittin.2     Passive exposure: Past    Smokeless tobacco: Never   Vaping Use    Vaping status: Never Used   Substance and Sexual Activity    Alcohol use: Yes     Comment: Alcoholic Drinks/day:  3-4 drinks per week     Drug use: No    Sexual activity: Yes     Partners: Female     Birth control/protection: Male Surgical   Other Topics Concern    Parent/sibling w/ CABG, MI or angioplasty before 65F 55M? No   Social History Narrative    Not on file     Social Determinants of Health     Financial Resource Strain: Low Risk  (1/18/2024)    Financial Resource Strain     Within the past 12 months, have you or your family members you live with been unable to get utilities (heat, electricity) when it was really needed?: No   Food Insecurity: Low Risk  (1/18/2024)    Food Insecurity     Within the past 12 months, did you worry that your food would run out before you got money to buy more?: No     Within the past 12 months, did the food you bought just not last and you didn t have money to get more?: No   Transportation Needs: Low Risk  (1/18/2024)    Transportation Needs     Within the past 12 months, has lack of transportation kept you from medical appointments, getting your medicines, non-medical meetings or appointments, work, or from getting things that you need?: No   Physical Activity: Not on file   Stress: Not on file   Social Connections: Not on file   Interpersonal Safety: Low Risk  (1/18/2024)    Interpersonal Safety     Do you feel physically and emotionally safe where you currently live?: Yes     Within the past 12 months, have you been hit, slapped, kicked or otherwise physically hurt by someone?: No     Within the past 12 months, have you been humiliated or emotionally abused in other ways by your partner or ex-partner?: No   Housing Stability: Low Risk  (1/18/2024)    Housing Stability     Do you have housing? : Yes     Are you worried about losing your housing?: No       Outpatient Encounter Medications as of 5/6/2024   Medication Sig Dispense Refill    cetirizine (ZYRTEC) 10 MG tablet Take 1 tablet (10 mg) by mouth daily 30 tablet 0    clobetasol propionate (TEMOVATE) 0.05 % external cream Apply  topically 2 times daily 60 g 0    tadalafil (CIALIS) 10 MG tablet Take to  Improve sexual function.  Your dose will be 5 mg to 20 mg.  Start by taking 10 mg ~60 minutes before sexual activity. Increase (or decrease) as needed. 30 tablet 5    tirzepatide-Weight Management (ZEPBOUND) 7.5 MG/0.5ML prefilled pen Inject 0.5 mLs (7.5 mg) Subcutaneous every 7 days 2 mL 0     No facility-administered encounter medications on file as of 5/6/2024.             Review Of Systems  Skin: As above  Eyes: negative  Ears/Nose/Throat: negative  Respiratory: No shortness of breath, dyspnea on exertion, cough, or hemoptysis  Cardiovascular: negative  Gastrointestinal: negative  Genitourinary: negative  Musculoskeletal: negative  Neurologic: negative  Psychiatric: negative  Hematologic/Lymphatic/Immunologic: negative  Endocrine: negative      O:   NAD, WDWN, Alert & Oriented, Mood & Affect wnl, Vitals stable   General appearance angella ii   Vitals stable   Alert, oriented and in no acute distress   L cheek stuck on papule  L shin stuck on papule  Stuck on papules and brown macules on trunk and ext    Red papules on trunk   Flesh colored papules on trunk   Eczematous plaques on legs, arms and back   Eyes: Conjunctivae/lids:Normal     ENT: Lips, mucosa: normal    MSK:Normal    Cardiovascular: peripheral edema none    Pulm: Breathing Normal    Neuro/Psych: Orientation:Normal; Mood/Affect:Normal      A/P:  1. Seborrheic keratosis, lentigo, angioma, dermal nevus  2. Dermatitis  Stop ivory soap  Emollient use discussed with patient   Claritin two in am  Zyrtec two bedtime  Betamethasone twice daily  Return to clinic 4 weeks  It was a pleasure speaking to Jones Miller today.  Previous clinic  notes and pertinent laboratory tests were reviewed prior to Jones Miller's visit.  Skin care regimen reviewed with patient: Eliminate harsh soaps, i.e. Dial, zest, irsih spring; Mild soaps such as Cetaphil or Dove sensitive skin, avoid hot or cold  showers, aggressive use of emollients including vanicream, cetaphil or cerave discussed with patient.

## 2024-05-06 NOTE — LETTER
5/6/2024         RE: Jones Miller  [address]  [facility] [address]        Dear Colleague,    Thank you for referring your patient, Jones Miller, to the [facility]. Please see a copy of my visit note below.    Jones Miller , a 58 year old year old male patient, I was asked to see by Dr. Knight for spot on cheek and leg and rash.  USing ivory soap.  Very itchy, given cloebtasol but has not used.  Patient has no other skin complaints today.  Remainder of the HPI, Meds, PMH, Allergies, FH, and SH was reviewed in chart.      Past Medical History:   Diagnosis Date     Anxiety        Past Surgical History:   Procedure Laterality Date     COLONOSCOPY       PROSTATE BIOPSY       TONSILLECTOMY       VASECTOMY          Family History   Problem Relation Age of Onset     Chronic Obstructive Pulmonary Disease Mother      Obesity Mother      Parkinsonism Father      Depression Father      Diabetes Father      Anxiety Disorder Father      Substance Abuse Father      Colon Cancer Sister         Pre-cancer, twice annual colonoscopies     Substance Abuse Sister      Obesity Sister      Other Cancer Sister         Ovarian - Recently treated [facility] I genetic marker     No Known Problems Brother      No Known Problems Maternal Grandmother      No Known Problems Maternal Grandfather      Diabetes Paternal Grandmother      No Known Problems Paternal Grandfather      No Known Problems Daughter      No Known Problems Son      Breast Cancer Paternal Aunt      Breast Cancer Paternal Aunt      Prostate Cancer No family hx of      Thyroid Cancer No family hx of      Coronary Artery Disease No family hx of        Social History     Socioeconomic History     Marital status:      Spouse name: Not on file     Number of children: Not on file     Years of education: Not on file     Highest education level: Not on file   Occupational History     Not on file   Tobacco Use     Smoking status: Former     Current  packs/day: 0.00     Types: Cigarettes     Quit date: 1999     Years since quittin.2     Passive exposure: Past     Smokeless tobacco: Never   Vaping Use     Vaping status: Never Used   Substance and Sexual Activity     Alcohol use: Yes     Comment: Alcoholic Drinks/day: 3-4 drinks per week      Drug use: No     Sexual activity: Yes     Partners: Female     Birth control/protection: Male Surgical   Other Topics Concern     Parent/sibling w/ CABG, MI or angioplasty before 65F 55M? No   Social History Narrative     Not on file     Social Determinants of Health     Financial Resource Strain: Low Risk  (2024)    Financial Resource Strain      Within the past 12 months, have you or your family members you live with been unable to get utilities (heat, electricity) when it was really needed?: No   Food Insecurity: Low Risk  (2024)    Food Insecurity      Within the past 12 months, did you worry that your food would run out before you got money to buy more?: No      Within the past 12 months, did the food you bought just not last and you didn t have money to get more?: No   Transportation Needs: Low Risk  (2024)    Transportation Needs      Within the past 12 months, has lack of transportation kept you from medical appointments, getting your medicines, non-medical meetings or appointments, work, or from getting things that you need?: No   Physical Activity: Not on file   Stress: Not on file   Social Connections: Not on file   Interpersonal Safety: Low Risk  (2024)    Interpersonal Safety      Do you feel physically and emotionally safe where you currently live?: Yes      Within the past 12 months, have you been hit, slapped, kicked or otherwise physically hurt by someone?: No      Within the past 12 months, have you been humiliated or emotionally abused in other ways by your partner or ex-partner?: No   Housing Stability: Low Risk  (2024)    Housing Stability      Do you have housing? : Yes       Are you worried about losing your housing?: No       Outpatient Encounter Medications as of 5/6/2024   Medication Sig Dispense Refill     cetirizine (ZYRTEC) 10 MG tablet Take 1 tablet (10 mg) by mouth daily 30 tablet 0     clobetasol propionate (TEMOVATE) 0.05 % external cream Apply topically 2 times daily 60 g 0     tadalafil (CIALIS) 10 MG tablet Take to  Improve sexual function.  Your dose will be 5 mg to 20 mg.  Start by taking 10 mg ~60 minutes before sexual activity. Increase (or decrease) as needed. 30 tablet 5     tirzepatide-Weight Management (ZEPBOUND) 7.5 MG/0.5ML prefilled pen Inject 0.5 mLs (7.5 mg) Subcutaneous every 7 days 2 mL 0     No facility-administered encounter medications on file as of 5/6/2024.             Review Of Systems  Skin: As above  Eyes: negative  Ears/Nose/Throat: negative  Respiratory: No shortness of breath, dyspnea on exertion, cough, or hemoptysis  Cardiovascular: negative  Gastrointestinal: negative  Genitourinary: negative  Musculoskeletal: negative  Neurologic: negative  Psychiatric: negative  Hematologic/Lymphatic/Immunologic: negative  Endocrine: negative      O:   NAD, WDWN, Alert & Oriented, Mood & Affect wnl, Vitals stable   General appearance angella ii   Vitals stable   Alert, oriented and in no acute distress   L cheek stuck on papule  L shin stuck on papule  Stuck on papules and brown macules on trunk and ext    Red papules on trunk   Flesh colored papules on trunk   Eczematous plaques on legs, arms and back   Eyes: Conjunctivae/lids:Normal     ENT: Lips, mucosa: normal    MSK:Normal    Cardiovascular: peripheral edema none    Pulm: Breathing Normal    Neuro/Psych: Orientation:Normal; Mood/Affect:Normal      A/P:  1. Seborrheic keratosis, lentigo, angioma, dermal nevus  2. Dermatitis  Stop ivory soap  Emollient use discussed with patient   Claritin two in am  Zyrtec two bedtime  Betamethasone twice daily  Return to clinic 4 weeks  It was a pleasure speaking to  Jones Miller today.  Previous clinic  notes and pertinent laboratory tests were reviewed prior to Jones Miller's visit.  Skin care regimen reviewed with patient: Eliminate harsh soaps, i.e. Dial, zest, irsih spring; Mild soaps such as Cetaphil or Dove sensitive skin, avoid hot or cold showers, aggressive use of emollients including vanicream, cetaphil or cerave discussed with patient.        Again, thank you for allowing me to participate in the care of your patient.        Sincerely,        Faustino Mcghee MD

## 2024-05-17 ENCOUNTER — MYC MEDICAL ADVICE (OUTPATIENT)
Dept: FAMILY MEDICINE | Facility: CLINIC | Age: 59
End: 2024-05-17
Payer: COMMERCIAL

## 2024-05-17 DIAGNOSIS — E66.09 CLASS 1 OBESITY DUE TO EXCESS CALORIES WITH SERIOUS COMORBIDITY AND BODY MASS INDEX (BMI) OF 32.0 TO 32.9 IN ADULT: Primary | ICD-10-CM

## 2024-05-17 DIAGNOSIS — E66.811 CLASS 1 OBESITY DUE TO EXCESS CALORIES WITH SERIOUS COMORBIDITY AND BODY MASS INDEX (BMI) OF 32.0 TO 32.9 IN ADULT: Primary | ICD-10-CM

## 2024-05-17 DIAGNOSIS — E88.810 METABOLIC SYNDROME: ICD-10-CM

## 2024-06-04 ENCOUNTER — OFFICE VISIT (OUTPATIENT)
Dept: FAMILY MEDICINE | Facility: CLINIC | Age: 59
End: 2024-06-04
Attending: FAMILY MEDICINE
Payer: COMMERCIAL

## 2024-06-04 VITALS
DIASTOLIC BLOOD PRESSURE: 77 MMHG | OXYGEN SATURATION: 97 % | HEART RATE: 82 BPM | BODY MASS INDEX: 31.91 KG/M2 | HEIGHT: 72 IN | RESPIRATION RATE: 16 BRPM | SYSTOLIC BLOOD PRESSURE: 120 MMHG | TEMPERATURE: 98.4 F | WEIGHT: 235.6 LBS

## 2024-06-04 DIAGNOSIS — E78.00 HYPERCHOLESTEREMIA: ICD-10-CM

## 2024-06-04 DIAGNOSIS — E78.1 HYPERTRIGLYCERIDEMIA: ICD-10-CM

## 2024-06-04 DIAGNOSIS — E66.09 CLASS 1 OBESITY DUE TO EXCESS CALORIES WITH SERIOUS COMORBIDITY AND BODY MASS INDEX (BMI) OF 32.0 TO 32.9 IN ADULT: Primary | ICD-10-CM

## 2024-06-04 DIAGNOSIS — E66.811 CLASS 1 OBESITY DUE TO EXCESS CALORIES WITH SERIOUS COMORBIDITY AND BODY MASS INDEX (BMI) OF 32.0 TO 32.9 IN ADULT: Primary | ICD-10-CM

## 2024-06-04 DIAGNOSIS — E88.810 METABOLIC SYNDROME: ICD-10-CM

## 2024-06-04 PROCEDURE — 99214 OFFICE O/P EST MOD 30 MIN: CPT | Performed by: FAMILY MEDICINE

## 2024-06-04 PROCEDURE — G2211 COMPLEX E/M VISIT ADD ON: HCPCS | Performed by: FAMILY MEDICINE

## 2024-06-04 NOTE — PROGRESS NOTES
Assessment & Plan   Problem List Items Addressed This Visit       Class 1 obesity due to excess calories with serious comorbidity and body mass index (BMI) of 32.0 to 32.9 in adult - Primary     58 year old year old male in clinic today to discuss treatment of the following conditions through diet and lifestyle modification and weight loss:  1. Class 1 obesity due to excess calories with serious comorbidity and body mass index (BMI) of 32.0 to 32.9 in adult    2. Hypercholesteremia    3. Hypertriglyceridemia    4. Metabolic syndrome      The patient's weight loss result since the last visit was successful based on weight loss.  Continues to struggle to achieve his nutritional goals.  Discussed need to increase protein.  Rare alcohol.  Continue current plan.  He will need weight/height measurement in 4-6 weeks as his approval for this medicine started in March and ends in late July.  I would also point out that this is not a normal trial period and that the supply of the medicine has been inconsistent.  We discussed that many insurance plans need documented evidence of 5-6% weight after 6 months.  I anticipate he will meet the threshold but if he does not I would advocate on his behalf with a letter of appeal.    BMI: Body mass index is 32.4 kg/m .  Ideal body weight: 76.5 kg (168 lb 8.7 oz)  Adjusted ideal body weight: 88.6 kg (195 lb 5.8 oz)    Current therapies:    Medications: YES Currently Zepbound   - Starting weight for GLP1 receptor agonist: 242 lbs   - Total weight loss: 7 lbs (2.8%)    Nutritional goals/strategies: reviewed.   - caloric restriction: Yes   - time restriction: NO   - diet (macronutrient) framework: caloric restriction    Movement:   - predominantly cardiovascular    Follow-up: 3 months           Relevant Medications    tirzepatide-Weight Management (ZEPBOUND) 12.5 MG/0.5ML prefilled pen    Hypercholesteremia    Relevant Medications    tirzepatide-Weight Management (ZEPBOUND) 12.5 MG/0.5ML  "prefilled pen    Hypertriglyceridemia    Relevant Medications    tirzepatide-Weight Management (ZEPBOUND) 12.5 MG/0.5ML prefilled pen    Metabolic syndrome    Relevant Medications    tirzepatide-Weight Management (ZEPBOUND) 12.5 MG/0.5ML prefilled pen      The longitudinal plan of care for the diagnosis(es)/condition(s) as documented were addressed during this visit. Due to the added complexity in care, I will continue to support Jones in the subsequent management and with ongoing continuity of care.    31 minutes spent by me on the date of the encounter doing chart review, history and exam, documentation and further activities per the note       BMI  Estimated body mass index is 32.4 kg/m  as calculated from the following:    Height as of this encounter: 1.816 m (5' 11.5\").    Weight as of this encounter: 106.9 kg (235 lb 9.6 oz).   Weight management plan: Specific weight management program called [facility] discussed    Subjective   Jones is a 58 year old, presenting for the following health issues:  Weight Loss (Follow-up )        6/4/2024    12:30 PM   Additional Questions   Roomed by xl     Patient presents for treatment of chronic, comorbid conditions using intensive therapeutic lifestyle interventions including nutrition, physical activity, and behavior management.   -he has not been able to fill the medication consisently.    - successes: happy to being losing weight. \"I am doing moderately well.\"    - struggles: availability of medication.     - movement: 3 mile loop with dog 2-3x/week     - tracking/journaling: ad collins.  \"I feel it when I eat carbs.\"  More salads.  He is getting protein.  Wife vegeterrean.  Meat 2-3 days per week.    - following nutritional plan: yes.  Deviations from plan: carbs   - hunger: Stable.    - medication benefits: helpful. side effects: mild constipation.    History of Present Illness       Reason for visit:  Follow up re Zepbound use    He eats 2-3 servings " "of fruits and vegetables daily.He consumes 0 sweetened beverage(s) daily.He exercises with enough effort to increase his heart rate 60 or more minutes per day.  He exercises with enough effort to increase his heart rate 4 days per week. He is missing 1 dose(s) of medications per week.          Objective    /77 (BP Location: Left arm, Patient Position: Sitting, Cuff Size: Adult Large)   Pulse 82   Temp 98.4  F (36.9  C) (Oral)   Resp 16   Ht 1.816 m (5' 11.5\")   Wt 106.9 kg (235 lb 9.6 oz)   SpO2 97%   BMI 32.40 kg/m    Body mass index is 32.4 kg/m .  Physical Exam  Nursing note reviewed.   Constitutional:       General: He is not in acute distress.     Appearance: Normal appearance. He is not ill-appearing.   HENT:      Head: Normocephalic and atraumatic.   Eyes:      Extraocular Movements: Extraocular movements intact.      Conjunctiva/sclera: Conjunctivae normal.   Pulmonary:      Effort: Pulmonary effort is normal.   Neurological:      Mental Status: He is alert and oriented to person, place, and time.   Psychiatric:         Attention and Perception: Attention normal.         Mood and Affect: Mood normal.         Speech: Speech normal.         Thought Content: Thought content normal.                    Signed Electronically by: Feliberto Knight MD    "

## 2024-06-04 NOTE — ASSESSMENT & PLAN NOTE
58 year old year old male in clinic today to discuss treatment of the following conditions through diet and lifestyle modification and weight loss:  1. Class 1 obesity due to excess calories with serious comorbidity and body mass index (BMI) of 32.0 to 32.9 in adult    2. Hypercholesteremia    3. Hypertriglyceridemia    4. Metabolic syndrome      The patient's weight loss result since the last visit was successful based on weight loss.  Continues to struggle to achieve his nutritional goals.  Discussed need to increase protein.  Rare alcohol.  Continue current plan.  He will need weight/height measurement in 4-6 weeks as his approval for this medicine started in March and ends in late July.  I would also point out that this is not a normal trial period and that the supply of the medicine has been inconsistent.  We discussed that many insurance plans need documented evidence of 5-6% weight after 6 months.  I anticipate he will meet the threshold but if he does not I would advocate on his behalf with a letter of appeal.    BMI: Body mass index is 32.4 kg/m .  Ideal body weight: 76.5 kg (168 lb 8.7 oz)  Adjusted ideal body weight: 88.6 kg (195 lb 5.8 oz)    Current therapies:    Medications: YES Currently Zepbound   - Starting weight for GLP1 receptor agonist: 242 lbs   - Total weight loss: 7 lbs (2.8%)    Nutritional goals/strategies: reviewed.   - caloric restriction: Yes   - time restriction: NO   - diet (macronutrient) framework: caloric restriction    Movement:   - predominantly cardiovascular    Follow-up: 3 months

## 2024-06-10 ENCOUNTER — VIRTUAL VISIT (OUTPATIENT)
Dept: DERMATOLOGY | Facility: CLINIC | Age: 59
End: 2024-06-10
Payer: COMMERCIAL

## 2024-06-10 DIAGNOSIS — L30.9 DERMATITIS: Primary | ICD-10-CM

## 2024-06-10 PROCEDURE — 99442 PR PHYSICIAN TELEPHONE EVALUATION 11-20 MIN: CPT | Mod: 93 | Performed by: DERMATOLOGY

## 2024-06-10 NOTE — LETTER
"6/10/2024      Jones Miller  [address]  [facility] [address]      Dear Colleague,    Thank you for referring your patient, Jones Miller, to the [facility]. Please see a copy of my visit note below.        Jones Miller is a 58 year old male who is being evaluated via a phone  visit.      The patient has been notified of following:     \"This phone  visit will be conducted via a call between you and your physician/provider. We have found that certain health care needs can be provided without the need for an in-person physical exam.  This service lets us provide the care you need with a video conversation.  If a prescription is necessary we can send it directly to your pharmacy.  If lab work is needed we can place an order for that and you can then stop by our lab to have the test done at a later time.    Phone visits are billed at different rates depending on your insurance coverage.  Please reach out to your insurance provider with any questions.    If during the course of the call the physician/provider feels a phone visit is not appropriate, you will not be charged for this service.\"    Patient has given verbal consent for phone visit? Yes    How would you like to obtain your AVS? Sajihart    Jones Miller is a 58 year old year old male patient here today for follow up dermatitis.  All clear per patient.  Patient has no other skin complaints today.  Remainder of the HPI, Meds, PMH, Allergies, FH, and SH was reviewed in chart.      Past Medical History:   Diagnosis Date     Anxiety        Past Surgical History:   Procedure Laterality Date     COLONOSCOPY       PROSTATE BIOPSY       TONSILLECTOMY       VASECTOMY          Family History   Problem Relation Age of Onset     Chronic Obstructive Pulmonary Disease Mother      Obesity Mother      Parkinsonism Father      Depression Father      Diabetes Father      Anxiety Disorder Father      Substance Abuse Father      Colon Cancer Sister      "    Pre-cancer, twice annual colonoscopies     Substance Abuse Sister      Obesity Sister      Other Cancer Sister         Ovarian - Recently treated [facility] I genetic marker     No Known Problems Brother      No Known Problems Maternal Grandmother      No Known Problems Maternal Grandfather      Diabetes Paternal Grandmother      No Known Problems Paternal Grandfather      No Known Problems Daughter      No Known Problems Son      Breast Cancer Paternal Aunt      Breast Cancer Paternal Aunt      Prostate Cancer No family hx of      Thyroid Cancer No family hx of      Coronary Artery Disease No family hx of        Social History     Socioeconomic History     Marital status:      Spouse name: Not on file     Number of children: Not on file     Years of education: Not on file     Highest education level: Not on file   Occupational History     Not on file   Tobacco Use     Smoking status: Former     Current packs/day: 0.00     Types: Cigarettes     Quit date: 1999     Years since quittin.3     Passive exposure: Past     Smokeless tobacco: Never   Vaping Use     Vaping status: Never Used   Substance and Sexual Activity     Alcohol use: Yes     Comment: Alcoholic Drinks/day: 3-4 drinks per week      Drug use: No     Sexual activity: Yes     Partners: Female     Birth control/protection: Male Surgical   Other Topics Concern     Parent/sibling w/ CABG, MI or angioplasty before 65F 55M? No   Social History Narrative     Not on file     Social Determinants of Health     Financial Resource Strain: Low Risk  (2024)    Financial Resource Strain      Within the past 12 months, have you or your family members you live with been unable to get utilities (heat, electricity) when it was really needed?: No   Food Insecurity: Low Risk  (2024)    Food Insecurity      Within the past 12 months, did you worry that your food would run out before you got money to buy more?: No      Within the past 12 months, did the  food you bought just not last and you didn t have money to get more?: No   Transportation Needs: Low Risk  (1/18/2024)    Transportation Needs      Within the past 12 months, has lack of transportation kept you from medical appointments, getting your medicines, non-medical meetings or appointments, work, or from getting things that you need?: No   Physical Activity: Not on file   Stress: Not on file   Social Connections: Not on file   Interpersonal Safety: Low Risk  (1/18/2024)    Interpersonal Safety      Do you feel physically and emotionally safe where you currently live?: Yes      Within the past 12 months, have you been hit, slapped, kicked or otherwise physically hurt by someone?: No      Within the past 12 months, have you been humiliated or emotionally abused in other ways by your partner or ex-partner?: No   Housing Stability: Low Risk  (1/18/2024)    Housing Stability      Do you have housing? : Yes      Are you worried about losing your housing?: No       Outpatient Encounter Medications as of 6/10/2024   Medication Sig Dispense Refill     tadalafil (CIALIS) 10 MG tablet Take to  Improve sexual function.  Your dose will be 5 mg to 20 mg.  Start by taking 10 mg ~60 minutes before sexual activity. Increase (or decrease) as needed. 30 tablet 5     tirzepatide-Weight Management (ZEPBOUND) 12.5 MG/0.5ML prefilled pen Inject 0.5 mLs (12.5 mg) Subcutaneous every 7 days 2 mL 1     No facility-administered encounter medications on file as of 6/10/2024.             Review Of Systems  Skin: As above  Eyes: negative  Ears/Nose/Throat: negative  Respiratory: No shortness of breath, dyspnea on exertion, cough, or hemoptysis  Cardiovascular: negative  Gastrointestinal: negative  Genitourinary: negative  Musculoskeletal: negative  Neurologic: negative  Psychiatric: negative  Hematologic/Lymphatic/Immunologic: negative  Endocrine: negative      O:   Alert & Orientedx3, Mood & Affect wnl,    General appearance  normal   Alert, oriented and in no acute distress    Clear per patient     Pulm: Breathing Normal, talking in normal sentences, no shortness of breath during conversation    Neuro/Psych: Orientation:Alert and Orientedx3 ; Mood/Affect:normal ; no anxiety or depression     A/P:  1.dermatitis all clear  Topicals prn   Skin care discussed with patient   Return to clinic prn   It was a pleasure speaking to Jones Aparicioluis fernando today.  Previous clinic  notes and pertinent laboratory tests were reviewed prior to Jones Aparicioluis fernando's visit.  Skin care regimen reviewed with patient: Eliminate harsh soaps, i.e. Dial, zest, irsih spring; Mild soaps such as Cetaphil or Dove sensitive skin, avoid hot or cold showers, aggressive use of emollients including vanicream, cetaphil or cerave discussed with patient.      Teledermatology information:  - Location of patient: home  - Location of teledermatologist: [facility]   - Reason teledermatology is appropriate: of National Emergency Regarding Coronavirus disease (COVID 19) Outbreak  - The patient's condition can safely be assessed using telemedicine: yes  - Method of transmission: store and forward teledermatology  - In-person dermatology visit recommendation: no  - Service start time:930am/pm  - Service end time:945am/pm  - Date of report: 06/10/24      Again, thank you for allowing me to participate in the care of your patient.        Sincerely,        Fausitno Mcghee MD

## 2024-06-10 NOTE — PROGRESS NOTES
"Jones Miller is a 58 year old male who is being evaluated via a phone  visit.      The patient has been notified of following:     \"This phone  visit will be conducted via a call between you and your physician/provider. We have found that certain health care needs can be provided without the need for an in-person physical exam.  This service lets us provide the care you need with a video conversation.  If a prescription is necessary we can send it directly to your pharmacy.  If lab work is needed we can place an order for that and you can then stop by our lab to have the test done at a later time.    Phone visits are billed at different rates depending on your insurance coverage.  Please reach out to your insurance provider with any questions.    If during the course of the call the physician/provider feels a phone visit is not appropriate, you will not be charged for this service.\"    Patient has given verbal consent for phone visit? Yes    How would you like to obtain your AVS? Devante    Jones Miller is a 58 year old year old male patient here today for follow up dermatitis.  All clear per patient.  Patient has no other skin complaints today.  Remainder of the HPI, Meds, PMH, Allergies, FH, and SH was reviewed in chart.      Past Medical History:   Diagnosis Date    Anxiety        Past Surgical History:   Procedure Laterality Date    COLONOSCOPY      PROSTATE BIOPSY      TONSILLECTOMY      VASECTOMY          Family History   Problem Relation Age of Onset    Chronic Obstructive Pulmonary Disease Mother     Obesity Mother     Parkinsonism Father     Depression Father     Diabetes Father     Anxiety Disorder Father     Substance Abuse Father     Colon Cancer Sister         Pre-cancer, twice annual colonoscopies    Substance Abuse Sister     Obesity Sister     Other Cancer Sister         Ovarian - Recently treated [facility] I genetic marker    No Known Problems Brother     No Known Problems Maternal Grandmother  "    No Known Problems Maternal Grandfather     Diabetes Paternal Grandmother     No Known Problems Paternal Grandfather     No Known Problems Daughter     No Known Problems Son     Breast Cancer Paternal Aunt     Breast Cancer Paternal Aunt     Prostate Cancer No family hx of     Thyroid Cancer No family hx of     Coronary Artery Disease No family hx of        Social History     Socioeconomic History    Marital status:      Spouse name: Not on file    Number of children: Not on file    Years of education: Not on file    Highest education level: Not on file   Occupational History    Not on file   Tobacco Use    Smoking status: Former     Current packs/day: 0.00     Types: Cigarettes     Quit date: 1999     Years since quittin.3     Passive exposure: Past    Smokeless tobacco: Never   Vaping Use    Vaping status: Never Used   Substance and Sexual Activity    Alcohol use: Yes     Comment: Alcoholic Drinks/day: 3-4 drinks per week     Drug use: No    Sexual activity: Yes     Partners: Female     Birth control/protection: Male Surgical   Other Topics Concern    Parent/sibling w/ CABG, MI or angioplasty before 65F 55M? No   Social History Narrative    Not on file     Social Determinants of Health     Financial Resource Strain: Low Risk  (2024)    Financial Resource Strain     Within the past 12 months, have you or your family members you live with been unable to get utilities (heat, electricity) when it was really needed?: No   Food Insecurity: Low Risk  (2024)    Food Insecurity     Within the past 12 months, did you worry that your food would run out before you got money to buy more?: No     Within the past 12 months, did the food you bought just not last and you didn t have money to get more?: No   Transportation Needs: Low Risk  (2024)    Transportation Needs     Within the past 12 months, has lack of transportation kept you from medical appointments, getting your medicines, non-medical  meetings or appointments, work, or from getting things that you need?: No   Physical Activity: Not on file   Stress: Not on file   Social Connections: Not on file   Interpersonal Safety: Low Risk  (1/18/2024)    Interpersonal Safety     Do you feel physically and emotionally safe where you currently live?: Yes     Within the past 12 months, have you been hit, slapped, kicked or otherwise physically hurt by someone?: No     Within the past 12 months, have you been humiliated or emotionally abused in other ways by your partner or ex-partner?: No   Housing Stability: Low Risk  (1/18/2024)    Housing Stability     Do you have housing? : Yes     Are you worried about losing your housing?: No       Outpatient Encounter Medications as of 6/10/2024   Medication Sig Dispense Refill    tadalafil (CIALIS) 10 MG tablet Take to  Improve sexual function.  Your dose will be 5 mg to 20 mg.  Start by taking 10 mg ~60 minutes before sexual activity. Increase (or decrease) as needed. 30 tablet 5    tirzepatide-Weight Management (ZEPBOUND) 12.5 MG/0.5ML prefilled pen Inject 0.5 mLs (12.5 mg) Subcutaneous every 7 days 2 mL 1     No facility-administered encounter medications on file as of 6/10/2024.             Review Of Systems  Skin: As above  Eyes: negative  Ears/Nose/Throat: negative  Respiratory: No shortness of breath, dyspnea on exertion, cough, or hemoptysis  Cardiovascular: negative  Gastrointestinal: negative  Genitourinary: negative  Musculoskeletal: negative  Neurologic: negative  Psychiatric: negative  Hematologic/Lymphatic/Immunologic: negative  Endocrine: negative      O:   Alert & Orientedx3, Mood & Affect wnl,    General appearance normal   Alert, oriented and in no acute distress    Clear per patient     Pulm: Breathing Normal, talking in normal sentences, no shortness of breath during conversation    Neuro/Psych: Orientation:Alert and Orientedx3 ; Mood/Affect:normal ; no anxiety or depression     A/P:  1.dermatitis  all clear  Topicals prn   Skin care discussed with patient   Return to clinic prn   It was a pleasure speaking to Jones Miller today.  Previous clinic  notes and pertinent laboratory tests were reviewed prior to Jones Miller's visit.  Skin care regimen reviewed with patient: Eliminate harsh soaps, i.e. Dial, zest, irsih spring; Mild soaps such as Cetaphil or Dove sensitive skin, avoid hot or cold showers, aggressive use of emollients including vanicream, cetaphil or cerave discussed with patient.      Teledermatology information:  - Location of patient: home  - Location of teledermatologist: [facility]   - Reason teledermatology is appropriate: of National Emergency Regarding Coronavirus disease (COVID 19) Outbreak  - The patient's condition can safely be assessed using telemedicine: yes  - Method of transmission: store and forward teledermatology  - In-person dermatology visit recommendation: no  - Service start time:930am/pm  - Service end time:945am/pm  - Date of report: 06/10/24

## 2024-06-24 ENCOUNTER — OFFICE VISIT (OUTPATIENT)
Dept: FAMILY MEDICINE | Facility: CLINIC | Age: 59
End: 2024-06-24
Payer: COMMERCIAL

## 2024-06-24 VITALS
OXYGEN SATURATION: 94 % | HEIGHT: 72 IN | RESPIRATION RATE: 16 BRPM | HEART RATE: 77 BPM | BODY MASS INDEX: 31.56 KG/M2 | DIASTOLIC BLOOD PRESSURE: 69 MMHG | SYSTOLIC BLOOD PRESSURE: 112 MMHG | WEIGHT: 233 LBS | TEMPERATURE: 98.2 F

## 2024-06-24 DIAGNOSIS — E66.811 CLASS 1 OBESITY DUE TO EXCESS CALORIES WITH SERIOUS COMORBIDITY AND BODY MASS INDEX (BMI) OF 32.0 TO 32.9 IN ADULT: ICD-10-CM

## 2024-06-24 DIAGNOSIS — E88.810 METABOLIC SYNDROME: ICD-10-CM

## 2024-06-24 DIAGNOSIS — E78.1 HYPERTRIGLYCERIDEMIA: ICD-10-CM

## 2024-06-24 DIAGNOSIS — R10.32 ABDOMINAL WALL PAIN IN LEFT LOWER QUADRANT: Primary | ICD-10-CM

## 2024-06-24 DIAGNOSIS — E78.00 HYPERCHOLESTEREMIA: ICD-10-CM

## 2024-06-24 DIAGNOSIS — E66.09 CLASS 1 OBESITY DUE TO EXCESS CALORIES WITH SERIOUS COMORBIDITY AND BODY MASS INDEX (BMI) OF 32.0 TO 32.9 IN ADULT: ICD-10-CM

## 2024-06-24 PROCEDURE — 99214 OFFICE O/P EST MOD 30 MIN: CPT | Performed by: FAMILY MEDICINE

## 2024-06-24 PROCEDURE — G2211 COMPLEX E/M VISIT ADD ON: HCPCS | Performed by: FAMILY MEDICINE

## 2024-06-24 ASSESSMENT — PATIENT HEALTH QUESTIONNAIRE - PHQ9
10. IF YOU CHECKED OFF ANY PROBLEMS, HOW DIFFICULT HAVE THESE PROBLEMS MADE IT FOR YOU TO DO YOUR WORK, TAKE CARE OF THINGS AT HOME, OR GET ALONG WITH OTHER PEOPLE: NOT DIFFICULT AT ALL
SUM OF ALL RESPONSES TO PHQ QUESTIONS 1-9: 3
SUM OF ALL RESPONSES TO PHQ QUESTIONS 1-9: 3

## 2024-06-24 NOTE — PROGRESS NOTES
Assessment & Plan   Problem List Items Addressed This Visit       Abdominal wall pain in left lower quadrant - Primary     Abdominal Wall Pain  - Assessment: Pain thought to be of muscular origin, possibly due to an injury to the abdominal wall muscles. No signs of intra-abdominal issues. No symptoms of colon cancer. Diverticulosis noted in sigmoid colon but not likely the cause of pain.  - Plan: Referral to physical therapy, specifically a pelvic floor physical therapist. No need for immediate CT scan unless new or different symptoms arise. If symptoms persist or worsen, patient should contact the clinic.          Relevant Orders    Physical Therapy  Referral    Class 1 obesity due to excess calories with serious comorbidity and body mass index (BMI) of 32.0 to 32.9 in adult    Relevant Medications    tirzepatide-Weight Management (ZEPBOUND) 12.5 MG/0.5ML prefilled pen    Hypercholesteremia    Relevant Medications    tirzepatide-Weight Management (ZEPBOUND) 12.5 MG/0.5ML prefilled pen    Hypertriglyceridemia    Relevant Medications    tirzepatide-Weight Management (ZEPBOUND) 12.5 MG/0.5ML prefilled pen    Metabolic syndrome    Relevant Medications    tirzepatide-Weight Management (ZEPBOUND) 12.5 MG/0.5ML prefilled pen      The longitudinal plan of care for the diagnosis(es)/condition(s) as documented were addressed during this visit. Due to the added complexity in care, I will continue to support Jones in the subsequent management and with ongoing continuity of care.    Chad Goldman is a 58 year old, presenting for the following health issues:  Abdominal Pain (Lower left side of abdominal pain x a couple months ago, possible hernia)        6/24/2024     3:01 PM   Additional Questions   Roomed by xl     - Reports sharp pain in lower left abdomen for the past two to three months  - Pain is more noticeable when laying on side or twisting in certain ways  - Pain has been progressively getting worse  -  "No bulges noticed  - Pain is more frequent and presents as a dull ache  - No correlation with current medication (Zepbound)    History of Present Illness       Reason for visit:  I think I may have a hurnia  Symptom onset:  More than a month  Symptoms include:  Shooting pain in lower left abdomen when turning or stretching  Symptom intensity:  Moderate  Symptom progression:  Worsening  Had these symptoms before:  No  What makes it worse:  Turning or stretching  What makes it better:  Relaxing    He eats 2-3 servings of fruits and vegetables daily.He consumes 0 sweetened beverage(s) daily.He exercises with enough effort to increase his heart rate 10 to 19 minutes per day.  He exercises with enough effort to increase his heart rate 5 days per week. He is missing 2 dose(s) of medications per week.        Objective    /69 (BP Location: Left arm, Patient Position: Sitting, Cuff Size: Adult Large)   Pulse 77   Temp 98.2  F (36.8  C) (Oral)   Resp 16   Ht 1.816 m (5' 11.5\")   Wt 105.7 kg (233 lb)   SpO2 94%   BMI 32.04 kg/m    Body mass index is 32.04 kg/m .  Physical Exam  Nursing note reviewed.   Constitutional:       General: He is not in acute distress.     Appearance: Normal appearance. He is not ill-appearing.   HENT:      Head: Normocephalic and atraumatic.      Right Ear: External ear normal.      Left Ear: External ear normal.   Eyes:      General: No scleral icterus.     Extraocular Movements: Extraocular movements intact.      Conjunctiva/sclera: Conjunctivae normal.   Cardiovascular:      Rate and Rhythm: Normal rate and regular rhythm.      Heart sounds: Normal heart sounds. No murmur heard.     No friction rub. No gallop.   Pulmonary:      Effort: Pulmonary effort is normal. No respiratory distress.      Breath sounds: Normal breath sounds. No wheezing or rales.   Abdominal:      Comments: Soft, nondistended.  Tenderness the transition of the musculature of the left iliac crest approximately at " the height of the umbilicus.  No palpable abnormality.  Tenderness is difficult to elicit.   Musculoskeletal:         General: No swelling. Normal range of motion.   Skin:     General: Skin is warm.      Coloration: Skin is not jaundiced.      Findings: No rash.   Neurological:      General: No focal deficit present.      Mental Status: He is alert and oriented to person, place, and time. Mental status is at baseline.   Psychiatric:         Attention and Perception: Attention normal.         Mood and Affect: Mood normal.         Speech: Speech normal.         Thought Content: Thought content normal.                Signed Electronically by: Feliberto Knight MD

## 2024-06-24 NOTE — ASSESSMENT & PLAN NOTE
Abdominal Wall Pain  - Assessment: Pain thought to be of muscular origin, possibly due to an injury to the abdominal wall muscles. No signs of intra-abdominal issues. No symptoms of colon cancer. Diverticulosis noted in sigmoid colon but not likely the cause of pain.  - Plan: Referral to physical therapy, specifically a pelvic floor physical therapist. No need for immediate CT scan unless new or different symptoms arise. If symptoms persist or worsen, patient should contact the clinic.

## 2024-07-10 ENCOUNTER — ALLIED HEALTH/NURSE VISIT (OUTPATIENT)
Dept: FAMILY MEDICINE | Facility: CLINIC | Age: 59
End: 2024-07-10
Attending: FAMILY MEDICINE
Payer: COMMERCIAL

## 2024-07-10 VITALS — HEIGHT: 72 IN | WEIGHT: 231.5 LBS | BODY MASS INDEX: 31.36 KG/M2

## 2024-07-10 DIAGNOSIS — E66.811 CLASS 1 OBESITY DUE TO EXCESS CALORIES WITH SERIOUS COMORBIDITY AND BODY MASS INDEX (BMI) OF 32.0 TO 32.9 IN ADULT: Primary | ICD-10-CM

## 2024-07-10 DIAGNOSIS — E66.09 CLASS 1 OBESITY DUE TO EXCESS CALORIES WITH SERIOUS COMORBIDITY AND BODY MASS INDEX (BMI) OF 32.0 TO 32.9 IN ADULT: Primary | ICD-10-CM

## 2024-07-10 PROCEDURE — 99207 PR NO CHARGE NURSE ONLY: CPT

## 2024-07-10 NOTE — PROGRESS NOTES
Wt Readings from Last 2 Encounters:   07/10/24 105 kg (231 lb 8 oz)   06/24/24 105.7 kg (233 lb)      Patient in clinic today for a weight check for Zepbound.

## 2024-07-18 ENCOUNTER — TELEPHONE (OUTPATIENT)
Dept: FAMILY MEDICINE | Facility: CLINIC | Age: 59
End: 2024-07-18
Payer: COMMERCIAL

## 2024-07-18 ENCOUNTER — MYC MEDICAL ADVICE (OUTPATIENT)
Dept: FAMILY MEDICINE | Facility: CLINIC | Age: 59
End: 2024-07-18
Payer: COMMERCIAL

## 2024-07-18 NOTE — TELEPHONE ENCOUNTER
"Spoke with patient to clarify request. Patient reports that coverage approval will  on 24. Previous PA encounter shows \"PA not needed\". Per patient, insurance will need PA renewal.     PA initiated in separate encounter.  "

## 2024-07-18 NOTE — TELEPHONE ENCOUNTER
Prior Authorization Request  Who s requesting:  Patient  Pharmacy Name and Location: [facility]  Medication Name: tirzepatide-Weight Management (ZEPBOUND) 12.5 MG/0.5ML prefilled pen   Insurance Plan: [employer]  Insurance Member ID Number:  [id number]   CoverMyMeds Key:   Informed patient that prior authorizations can take up to 10 business days for response:   Yes  Okay to leave a detailed message: Yes    Patient reports PA is expiring 7/25/24 and will need renewal.

## 2024-07-23 NOTE — TELEPHONE ENCOUNTER
Retail Pharmacy Prior Authorization Team   Phone: [phone]    PA Initiation    Medication: ZEPBOUND 12.5 MG/0.5ML SC SOAJ  Insurance Company: [employer] - Phone [phone] Fax [phone]  Pharmacy Filling the Rx: [facility] [place], [place] - [address]  Filling Pharmacy Phone: [phone]  Filling Pharmacy Fax:    Start Date: 7/23/2024      Note: Due to record-high volumes, our turn-around time is taking longer than usual . We are currently 6 days behind in the pools.   We are working diligently to submit all requests in a timely manner and in the order they are received. Please only flag TRUE URGENT requests as high priority to the pool at this time.   If you have questions on status of PA's,  please send a note/message in the active PA encounter and send back to the [facility] PA pool [[id number]].    If you have questions about the turn-around time or about our process, please reach out to our supervisor Erika Soto.   Thank you!   RPPA (Retail Pharmacy Prior Authorization) team

## 2024-07-23 NOTE — TELEPHONE ENCOUNTER
Retail Pharmacy Prior Authorization Team   Phone: [phone]    Prior Authorization Approval    Medication: ZEPBOUND 12.5 MG/0.5ML SC SOAJ  Authorization Effective Date: 6/23/2024  Authorization Expiration Date: 7/23/2025  Insurance Company: [employer] - Phone [phone] Fax [phone]  Which Pharmacy is filling the prescription: [address]  Pharmacy Notified: YES  Patient Notified: YES **Instructed pharmacy to notify patient when script is ready to /ship.**

## 2024-08-01 ENCOUNTER — OFFICE VISIT (OUTPATIENT)
Dept: AUDIOLOGY | Facility: CLINIC | Age: 59
End: 2024-08-01
Payer: COMMERCIAL

## 2024-08-01 DIAGNOSIS — H90.3 SENSORINEURAL HEARING LOSS, BILATERAL: Primary | ICD-10-CM

## 2024-08-01 PROCEDURE — 92591 PR HEARING AID EXAM BINAURAL: CPT | Performed by: AUDIOLOGIST

## 2024-08-01 NOTE — PROGRESS NOTES
AUDIOLOGY REPORT    SUBJECTIVE: Jones Miller is a 58 year old male was seen in the Audiology Clinic at  [facility] on 8/01/24 to discuss concerns with hearing and functional communication difficulties. Jones has been seen previously on 12/07/2023, and results revealed normal hearing through 2000 Hz sloping to mild sensorineural hearing loss, bilaterally. Jones notes difficulty with communication in a variety of listening situations. 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 He finds group environments most difficult for listening, but mishears speech at times even at home. He is outdoors frequently for walks and working in the yard. He does wear hearing protection when using yard equipment. He plays music in a rock band. He may be interested in getting in-ear monitors in the future if they get a new sound system. He uses a [employer] phone and currently uses [employer] for streaming.    OBJECTIVE:  Patient is a hearing aid candidate. Patient would like to move forward with a hearing aid evaluation today. Therefore, the patient was presented with different options for amplification to help aid in communication. Discussed styles, levels of technology and monaural vs. binaural fitting.     The hearing aid(s) mutually chosen were:  Binaural: Oticon Intent 1 RITE  COLOR: Chroma beige  BATTERY SIZE: rechargeable  EARMOLD/TIPS: stock domes to be chosen at fitting  CANAL/ LENGTH: 2    ASSESSMENT:   Hearing aid selection today for patient with bilateral sensorineural hearing loss.    Reviewed purchase information and warranty information with patient. The 45 day trial period was explained to patient. The patient was given a copy of the [place] Department of [employer] consumer brochure on purchasing hearing instruments. Patient risk factors have been provided to the patient in writing prior to the sale of the hearing aid per FDA regulation. The risk  factors are also available in the User Instructional Booklet to be presented on the day of the hearing aid fitting. Hearing aid(s) ordered. Hearing aid evaluation completed.    PLAN: Jones is scheduled to return in 2-3 weeks for a hearing aid fitting and programming. Purchase agreement will be completed on that date. Please contact this clinic with any questions or concerns.      Sabi Khoury, [facility]  [place] Licensed Audiologist #[id number]

## 2024-09-10 ENCOUNTER — OFFICE VISIT (OUTPATIENT)
Dept: AUDIOLOGY | Facility: CLINIC | Age: 59
End: 2024-09-10
Payer: COMMERCIAL

## 2024-09-10 DIAGNOSIS — H90.3 SENSORINEURAL HEARING LOSS, BILATERAL: Primary | ICD-10-CM

## 2024-09-10 PROCEDURE — V5160 DISPENSING FEE BINAURAL: HCPCS | Performed by: AUDIOLOGIST

## 2024-09-10 PROCEDURE — V5011 HEARING AID FITTING/CHECKING: HCPCS | Performed by: AUDIOLOGIST

## 2024-09-10 PROCEDURE — V5261 HEARING AID, DIGIT, BIN, BTE: HCPCS | Performed by: AUDIOLOGIST

## 2024-09-10 PROCEDURE — V5020 CONFORMITY EVALUATION: HCPCS | Mod: RT | Performed by: AUDIOLOGIST

## 2024-09-10 NOTE — PATIENT INSTRUCTIONS

## 2024-09-10 NOTE — PROGRESS NOTES
AUDIOLOGY REPORT    SUBJECTIVE: Jones Miller, a 58 year old male, was seen in the Audiology Clinic at [facility] today for a binaural hearing aid fitting. Previous results have revealed normal hearing through 2000 Hz sloping to mild sensorineural hearing loss, bilaterally.      OBJECTIVE:  Prior to fitting, a hearing aid check was performed to ensure device functionality. The hearing aid conformity evaluation was completed.The hearing aids were placed and they provided a good fit. Real-ear-probe-microphone measurements were completed on the magnetU system and were a good match to NAL-NL2 target with soft sounds audible, moderate sounds comfortable, and loud sounds below discomfort. UCLs are verified through maximum power output measures and demonstrate appropriate limiting of loud inputs. Mr. Miller was oriented to proper hearing aid use, care, cleaning (no water, dry brush), batteries (size rechargeable, insertion/removal, toxicity, low-battery signal), aid insertion/removal, user booklet, warranty information, storage cases, and other hearing aid details. The patient confirmed understanding of hearing aid use and care, and showed proper insertion of hearing aid and batteries while in the office today. Mr. Miller initially reported that his voice had an echo. Gain was reduced 5 steps 125-750 Hz and increased 2 steps 9693-0237 Hz. Following Mr. Miller reported good volume and sound quality.    EAR(S) FIT: Binaural  MA HEARING AID MAKE: Right: Oticon; Left: Oticon    MA HEARING AID MODEL #: Right: Intent 1 miniRITE R chroma beige; Left: Intent 1 miniRITE R  chroma beige  HEARING AID STYLE: Right: NEGRO; Left: NEGRO  DOME SIZE: Right: Open bass 10 mm; Left: Open bass 10 mm    LENGTH: Right:  85 3R; Left:  85 3L  SERIAL NUMBERS: Right: [id number]; Left: [id number]  WARRANTY END DATE: Right: 9/4/2027; Left: 9/4/2027  INCLUDED ACCESSORY: [employer]     ASSESSMENT: Binaural hearing aid fitting  completed today. Verification measures were performed. The 45 day trial period was explained to patient, and they expressed understanding. Mr. Miller signed the Hearing Aid Purchase Agreement and was given a copy, as well as details on his hearing aids. Patient was counseled that exact out of pocket amounts cannot be determined for hearing aid claims being sent to insurance. Any insurance coverage information presented to the patient is an estimate only, and is not a guarantee of payment. Patient has been advised to check with their own insurance.    PLAN: Mr. Miller will return for follow-up in 2-3 weeks for a hearing aid review appointment. Please call this clinic with questions regarding today s appointment.    Sabi Khoury, [facility]  [place] Licensed Audiologist #[id number]

## 2024-09-24 ENCOUNTER — MYC REFILL (OUTPATIENT)
Dept: FAMILY MEDICINE | Facility: CLINIC | Age: 59
End: 2024-09-24
Payer: COMMERCIAL

## 2024-09-24 DIAGNOSIS — E66.811 CLASS 1 OBESITY DUE TO EXCESS CALORIES WITH SERIOUS COMORBIDITY AND BODY MASS INDEX (BMI) OF 32.0 TO 32.9 IN ADULT: ICD-10-CM

## 2024-09-24 DIAGNOSIS — E66.09 CLASS 1 OBESITY DUE TO EXCESS CALORIES WITH SERIOUS COMORBIDITY AND BODY MASS INDEX (BMI) OF 32.0 TO 32.9 IN ADULT: ICD-10-CM

## 2024-09-24 DIAGNOSIS — E88.810 METABOLIC SYNDROME: ICD-10-CM

## 2024-09-24 DIAGNOSIS — E78.00 HYPERCHOLESTEREMIA: ICD-10-CM

## 2024-09-24 DIAGNOSIS — E78.1 HYPERTRIGLYCERIDEMIA: ICD-10-CM

## 2024-09-26 ENCOUNTER — OFFICE VISIT (OUTPATIENT)
Dept: FAMILY MEDICINE | Facility: CLINIC | Age: 59
End: 2024-09-26
Attending: FAMILY MEDICINE
Payer: COMMERCIAL

## 2024-09-26 VITALS
WEIGHT: 227.7 LBS | HEIGHT: 72 IN | DIASTOLIC BLOOD PRESSURE: 77 MMHG | BODY MASS INDEX: 30.84 KG/M2 | SYSTOLIC BLOOD PRESSURE: 118 MMHG | TEMPERATURE: 98.4 F | OXYGEN SATURATION: 98 % | RESPIRATION RATE: 16 BRPM | HEART RATE: 69 BPM

## 2024-09-26 DIAGNOSIS — E66.09 CLASS 1 OBESITY DUE TO EXCESS CALORIES WITH SERIOUS COMORBIDITY AND BODY MASS INDEX (BMI) OF 31.0 TO 31.9 IN ADULT: Primary | ICD-10-CM

## 2024-09-26 DIAGNOSIS — E66.811 CLASS 1 OBESITY DUE TO EXCESS CALORIES WITH SERIOUS COMORBIDITY AND BODY MASS INDEX (BMI) OF 31.0 TO 31.9 IN ADULT: Primary | ICD-10-CM

## 2024-09-26 DIAGNOSIS — E78.1 HYPERTRIGLYCERIDEMIA: ICD-10-CM

## 2024-09-26 DIAGNOSIS — E78.00 HYPERCHOLESTEREMIA: ICD-10-CM

## 2024-09-26 DIAGNOSIS — E88.810 METABOLIC SYNDROME: ICD-10-CM

## 2024-09-26 PROCEDURE — 90673 RIV3 VACCINE NO PRESERV IM: CPT | Performed by: FAMILY MEDICINE

## 2024-09-26 PROCEDURE — 99213 OFFICE O/P EST LOW 20 MIN: CPT | Mod: 25 | Performed by: FAMILY MEDICINE

## 2024-09-26 PROCEDURE — 90471 IMMUNIZATION ADMIN: CPT | Performed by: FAMILY MEDICINE

## 2024-09-26 PROCEDURE — 91320 SARSCV2 VAC 30MCG TRS-SUC IM: CPT | Performed by: FAMILY MEDICINE

## 2024-09-26 PROCEDURE — 90480 ADMN SARSCOV2 VAC 1/ONLY CMP: CPT | Performed by: FAMILY MEDICINE

## 2024-09-26 NOTE — ASSESSMENT & PLAN NOTE
59 year old year old male in clinic today to discuss treatment of the following conditions through diet and lifestyle modification and weight loss:  1. Class 1 obesity due to excess calories with serious comorbidity and body mass index (BMI) of 32.0 to 32.9 in adult    2. Hypertriglyceridemia    3. Hypercholesteremia    4. Metabolic syndrome      The patient's weight loss result since the last visit was successful based on modest weight loss. Continues to work to improve nutrition.  Walking is primary form of exercise.  Strength?    - dermatitis rash related to medication? Continue to monitor.     BMI: Body mass index is 31.32 kg/m .  Ideal body weight: 76.5 kg (168 lb 8.7 oz)  Adjusted ideal body weight: 87.2 kg (192 lb 3.3 oz)    Current therapies:    Medications: YES Zepbound   - Starting weight for GLP1 receptor agonist: 242 lb   - Total weight loss: 15 lbs (6%) -this is since starting Zepbound.  He did lose quite a bit of weight previously on a GLP.    Nutritional goals/strategies: reviewed.   - caloric restriction: Yes   - time restriction: NO   - diet (macronutrient) framework: high protein/low carbohydrate     Follow-up: 6 months

## 2024-09-26 NOTE — PROGRESS NOTES
Assessment & Plan   Problem List Items Addressed This Visit       Class 1 obesity due to excess calories with serious comorbidity and body mass index (BMI) of 31.0 to 31.9 in adult - Primary     59 year old year old male in clinic today to discuss treatment of the following conditions through diet and lifestyle modification and weight loss:  1. Class 1 obesity due to excess calories with serious comorbidity and body mass index (BMI) of 32.0 to 32.9 in adult    2. Hypertriglyceridemia    3. Hypercholesteremia    4. Metabolic syndrome      The patient's weight loss result since the last visit was successful based on modest weight loss. Continues to work to improve nutrition.  Walking is primary form of exercise.  Strength?    - dermatitis rash related to medication? Continue to monitor.     BMI: Body mass index is 31.32 kg/m .  Ideal body weight: 76.5 kg (168 lb 8.7 oz)  Adjusted ideal body weight: 87.2 kg (192 lb 3.3 oz)    Current therapies:    Medications: YES Zepbound   - Starting weight for GLP1 receptor agonist: 242 lb   - Total weight loss: 15 lbs (6%) -this is since starting Zepbound.  He did lose quite a bit of weight previously on a GLP.    Nutritional goals/strategies: reviewed.   - caloric restriction: Yes   - time restriction: NO   - diet (macronutrient) framework: high protein/low carbohydrate     Follow-up: 6 months           Relevant Medications    tirzepatide-Weight Management (ZEPBOUND) 15 MG/0.5ML prefilled pen    Hypercholesteremia    Relevant Medications    tirzepatide-Weight Management (ZEPBOUND) 15 MG/0.5ML prefilled pen    Hypertriglyceridemia    Relevant Medications    tirzepatide-Weight Management (ZEPBOUND) 15 MG/0.5ML prefilled pen    Metabolic syndrome    Relevant Medications    tirzepatide-Weight Management (ZEPBOUND) 15 MG/0.5ML prefilled pen      The longitudinal plan of care for the diagnosis(es)/condition(s) as documented were addressed during this visit. Due to the added complexity  "in care, I will continue to support Jones in the subsequent management and with ongoing continuity of care.      Subjective   Jones is a 59 year old, presenting for the following health issues:  Weight Loss (Follow-up )        6/24/2024     3:01 PM   Additional Questions   Roomed by anam     Patient presents for treatment of chronic, comorbid conditions using intensive therapeutic lifestyle interventions including nutrition, physical activity, and behavior management.   - successes: happy with weight loss.  Down 40+ lbs. Wonders about adjusting dose upward.   - struggles: rare convenience eating.     - movement: walk dog - 3 mile loop.  3-4x/week. Thinking about weights.     - tracking/journaling: focusing on protein.     - following nutritional plan: yes.  Deviations from plan: infrequent   - hunger: Stable.    - medication benefits: helpful with weight loss and regulation of appetite.. Side effects: \"super light indigestion.\"  Slowed BMs which is worse with certain foods.        History of Present Illness       Reason for visit:  Follow up on zepbound treatment    He eats 2-3 servings of fruits and vegetables daily.He consumes 0 sweetened beverage(s) daily.He exercises with enough effort to increase his heart rate 60 or more minutes per day.  He exercises with enough effort to increase his heart rate 3 or less days per week.   He is taking medications regularly.        Objective    /77 (BP Location: Left arm, Patient Position: Sitting, Cuff Size: Adult Large)   Pulse 69   Temp 98.4  F (36.9  C) (Oral)   Resp 16   Ht 1.816 m (5' 11.5\")   Wt 103.3 kg (227 lb 11.2 oz)   SpO2 98%   BMI 31.32 kg/m    Body mass index is 31.32 kg/m .  Physical Exam  Nursing note reviewed.   Constitutional:       General: He is not in acute distress.     Appearance: Normal appearance. He is not ill-appearing.   HENT:      Head: Normocephalic and atraumatic.   Eyes:      Extraocular Movements: Extraocular movements intact.      " Conjunctiva/sclera: Conjunctivae normal.   Pulmonary:      Effort: Pulmonary effort is normal.   Neurological:      Mental Status: He is alert and oriented to person, place, and time.   Psychiatric:         Attention and Perception: Attention normal.         Mood and Affect: Mood normal.         Speech: Speech normal.         Thought Content: Thought content normal.                Signed Electronically by: Feliberto Knight MD

## 2024-10-07 ENCOUNTER — OFFICE VISIT (OUTPATIENT)
Dept: AUDIOLOGY | Facility: CLINIC | Age: 59
End: 2024-10-07
Payer: COMMERCIAL

## 2024-10-07 DIAGNOSIS — H90.3 SENSORINEURAL HEARING LOSS, BILATERAL: Primary | ICD-10-CM

## 2024-10-07 PROCEDURE — V5010 ASSESSMENT FOR HEARING AID: HCPCS | Performed by: AUDIOLOGIST

## 2024-10-07 NOTE — PROGRESS NOTES
AUDIOLOGY REPORT    SUBJECTIVE: Jones Miller is a 59 year old male who was seen in the Audiology Clinic at the [facility] on 10/7/2024 for a follow-up check regarding the fitting of new hearing aids. Previous results have revealed normal hearing through 2000 Hz sloping to mild sensorineural hearing loss, bilaterally. The patient was fit with Oticon Intent 1 miniRITE R on 09/10/2024. Today, Jones reports that he has been wearing the hearing aids every day with the exception of a couple days. He has been frustrated with the bluetooth connectivity, as it seems cumbersome getting the preferred bluetooth connection from his phone between the car and his hearing aids when going in and out of the car, and because though he hears others well on the phone when streaming calls, others cannot hear him well. He does not feel like he is benefiting significantly from the amplification of speech in general. He has not noticed a big improvement in his hearing, even in noisy environments, where he feels he cannot appreciate the noise reduction features the  advertises. He finds that he gets an echo of his own voice whenever he is outdoors, and gets an echo when playing music. He currently does not feel that the devices are performing at a level consistent with the cost.    OBJECTIVE:   Discussed troubleshooting with the current hearing aids versus trying a different model from a different . Decided to move forward with troubleshooting, but also order another set of hearing aids to trial at the next appointment with the potential for completing an even exchange at that time should the other set seem to outperform for him the current set with the changes we make. Phonak OPENLANEeo [id number] R -in-the-ear hearing aids will be ordered with size 1 receivers.    Based on patient report, the following changes were made with advice from an [facility] consulted via phone:  1)  "Jones's phone ([employer] 8 Pro) settings were changed to turn on \"clear calling\" (under settings, sound & vibration) to reduce background noise for the audio for Jones's voice when streaming phone calls (audio picking up from his hearing aids).  2) Oticon's suggestion for getting the car to connect with the phone as a priority over the hearing aids while driving is to turn the hearing aids into airplane mode (hold button for 7 seconds) to turn off the hearing aid bluetooth when in the car. Jones will try this.  3) To decrease echo both in the outdoor setting and for music, decreased low frequency low input gain and decreased 1-1.5 kHz 3 steps for mid and low inputs. Jones reported that his voice seemed to have less echo.  4) Created a manual music program to try for when playing music to see if the features being reduced in that setting helps prevent an echo (if the above change does not solve the problem when playing music in the automatic program). Also added a manual restaurant program to try. Went over how to get to these programs in the mino and by use of the hearing aid button.  5) Changed \"sound enhancer\" to \"detail\" and \"brightness\" to \"brighter\" to give more definition to high frequency speech sounds.    No charge visit today (in warranty hearing aid check).    ASSESSMENT: A follow-up appointment for hearing aid fitting was completed today. Changes to hearing aid was completed as outlined above.     PLAN: Jones will return for follow-up in 3 weeks, at which time we will decide whether to continue with the current aids versus complete a fitting with another set of aids from a different . Please call this clinic with any questions regarding today s appointment.      Sabi Khoury, [facility]  [place] Licensed Audiologist #[id number]     "

## 2024-10-25 NOTE — PROGRESS NOTES
AUDIOLOGY REPORT  SUBJECTIVE: Jones Miller is a 59 year old male who was seen in the Audiology Clinic at the [facility] for a follow-up hearing aid check and potential fitting of Phonak Audeo I66-Mflzpy R -in-the-ear hearing aids as an even exchange from his recently fit Oticon hearing aids. Previous results have revealed normal hearing through 2000 Hz sloping to mild sensorineural hearing loss, bilaterally. The patient was fit with Oticon Intent 1 miniRITE R on 09/10/2024. Adjustments were made to the hearing aids at his previous follow-up appointment 10/7/2024 due to complaints about the aids not providing significant benefit, an echo to the sound in certain situations, background noise reduction seeming less impressive than advertised, and some difficulty with the bluetooth connection and other people hearing him on streamed phone calls. Today, Jones reports that he continues to feel little difference in his functioning with or without the Oticon hearing aids on. He would like to move forward with return of the Oticon aids and fitting of the Phonak aids.  OBJECTIVE: The Oticon Intent 1 miniRITE R hearing aids, , and accessory were returned today, and Jones was fit with new Phonak Audeo W58-Rmvkli R -in-the-ear hearing aids. The hearing aid conformity evaluation was completed. The hearing aids were placed and they provided a good fit. Real-ear-probe-microphone measurements were completed on the Yumit system and were a good match to NAL-NL2 target with soft sounds audible, moderate sounds comfortable, and loud sounds below discomfort. UCLs are verified through maximum power output measures and demonstrate appropriate limiting of loud inputs. Jones was oriented to proper hearing aid use, care, cleaning (no water, dry brush), batteries (size: BATTERY SIZE: rechargeable, insertion/removal, toxicity, low-battery signal), aid insertion/removal, user booklet, warranty  information, storage cases, and other hearing aid details. The patient confirmed understanding of hearing aid use and care, and showed proper insertion of hearing aid and batteries while in the office today. Jones reported good volume and sound quality today.   EAR(S) FIT: Binaural  HEARING AID MAKE: Right: Phonak; Left: Phonak    HEARING AID MODEL #: Right: Audeo N49-Umgdqu R sand beige; Left: Audeo L18-Oaiteu R sand beige  HEARING AID STYLE: Right: NEGRO; Left: NEGRO  DOME SIZE: Right: Large open; Left: Large open   LENGTH: Right:  2S; Left:  2S  SERIAL NUMBERS: Right: [id number]; Left: [id number]  WARRANTY END DATE: Right: 11/5/2027; Left: 11/5/2027  INCLUDED ACCESSORY: [employer]  ASSESSMENT: New hearing aids fit today. Verification measures were performed. Jones signed the Hearing Aid Purchase Agreement and was given a copy, as well as details on his hearing aids. Note that this is an even exchange from previously fit hearing aids of the same technology level, so there is no charge for today's appointment.  PLAN: oJnes will return for follow-up in 2-3 weeks for a hearing aid review appointment. Please call this clinic with questions regarding today s appointment.    Sabi Khoury, CCC-A  Doctor of Audiology  [place] Licensed Audiologist #[id number]

## 2024-10-28 ENCOUNTER — OFFICE VISIT (OUTPATIENT)
Dept: AUDIOLOGY | Facility: CLINIC | Age: 59
End: 2024-10-28
Payer: COMMERCIAL

## 2024-10-28 DIAGNOSIS — H90.3 SENSORINEURAL HEARING LOSS, BILATERAL: Primary | ICD-10-CM

## 2024-10-28 PROCEDURE — V5010 ASSESSMENT FOR HEARING AID: HCPCS | Performed by: AUDIOLOGIST

## 2024-11-15 ENCOUNTER — OFFICE VISIT (OUTPATIENT)
Dept: AUDIOLOGY | Facility: CLINIC | Age: 59
End: 2024-11-15
Payer: COMMERCIAL

## 2024-11-15 DIAGNOSIS — H90.3 SENSORINEURAL HEARING LOSS, BILATERAL: Primary | ICD-10-CM

## 2024-11-15 NOTE — PROGRESS NOTES
AUDIOLOGY REPORT    SUBJECTIVE: Jones Miller is a 59 year old male who was seen in the Audiology Clinic at the [facility] [place] on 11/15/2024 for a follow-up check regarding the fitting of new hearing aids. Previous results have revealed normal hearing through 2000 Hz sloping to mild sensorineural hearing loss, bilaterally. The patient was fit with Oticon Intent 1 miniRITE R on 09/10/2024. These were returned due to perceived lack of significant benefit with complaints of difficulty with bluetooth connectivity and issues with sound quality. Jones was fit with Phonak [employer] R87-Gxejsd R -in-the-ear hearing aids 10/28/2024. Jones wrote in with complaints about the new aids that the domes were itchy, the thickest part of the BTE component was causing soreness on the head, the aids are not maintaining the connection with the mino, and he continues to experience an echo in the sound. He returns today for troubleshooting. As discussed via [employer] messaging, the only aspect of his complaints that may not be able to be addressed is the shape and size of the device itself. Given this, [employer] AI 24 mRIC R -in-the-ear devices were ordered as an alternative option.    OBJECTIVE:   After some discussion and troubleshooting with the Phonak devices, it was decided that the soreness with the size of the aid is not worth trying to work around. We will move forward with fitting of the Rosalia devices.    The hearing aid conformity evaluation was completed.The hearing aids were placed and they provided a good fit. Real-ear-probe-microphone measurements were completed on the [employer] system and were a good match to NAL-NL2 target with soft sounds audible, moderate sounds comfortable, and loud sounds below discomfort. UCLs are verified through maximum power output measures and demonstrate appropriate limiting of loud inputs. Jones was oriented to proper hearing aid use, care, cleaning (no water,  "dry brush), batteries (size: BATTERY SIZE: rechargeable, insertion/removal, toxicity, low-battery signal), aid insertion/removal, user booklet, warranty information, storage cases, and other hearing aid details. The patient confirmed understanding of hearing aid use and care, and showed proper insertion of hearing aids while in the office today. He felt more comfortable with a smaller size of dome (6 mm open), so retention lines were added to ensure they stay in place. He was also provided with some 8 mm open domes to have at home in case he feels the fit is too loose and he'd like to try the larger size. Low frequency gain was increased overall for occlusion, and further for some possible perception of mechanical noise. Following, Jnoes still reported that he heard what he refers to as a high frequency echo that sounds a bit like an electronic delay at times while he or I were speaking. It seems this is the perception that he gets (with all manufacturers' aids) with high frequency amplification, as the perception did reduce with a reduction in high frequency gain. High frequency gain was reduced by 2 steps, and Jones reported that the sound quality and volume was good. We will leave his primary program at this setting. He requested that the slightly louder setting be added as a secondary program, so it was added as a secondary \"personal\" program that can be accessed in the mino. The volume control was set for the right to raise and left lower. The Edge control feature was turned on for a double tap. Jones was provided with a printout of the button functions.  EAR(S) FIT: Binaural  HEARING AID MAKE: Right: Rosalia; Left: [facility]    HEARING AID MODEL #: Right: Edge  24 mRI R beige; Left: Edge  24 mRIC R beige  HEARING AID STYLE: Right: NEGRO; Left: NEGRO  DOME SIZE: Right: Open 6 mm with retention tail; Left: Open 6 mm with retention tail   LENGTH: Right: M3R; Left:  M3L  SERIAL NUMBERS: Right: [id number]; " Left: [id number]  WARRANTY END DATE: Right: 2/12/2028; Left: 2/12/2028   INCLUDED ACCESSORY: To be determined/ordered from [facility] later (due to some items not yet released for this product)  No charge visit today (even exchange).  ASSESSMENT: Binaural Rosalia hearing aids fit today as an even exchange of previously fit aids. Verification measures were performed. Jones signed the Hearing Aid Purchase Agreement and was given a copy, as well as details on his hearing aids.  PLAN:Jones will return for follow-up in 3-4 weeks for a hearing aid review appointment. Please call this clinic with questions regarding today s appointment.    Sabi Khoury, [facility]-A  [place] Licensed Audiologist #[id number]

## 2024-12-16 ENCOUNTER — OFFICE VISIT (OUTPATIENT)
Dept: AUDIOLOGY | Facility: CLINIC | Age: 59
End: 2024-12-16
Payer: COMMERCIAL

## 2024-12-16 DIAGNOSIS — H90.3 SENSORINEURAL HEARING LOSS, BILATERAL: Primary | ICD-10-CM

## 2024-12-16 PROCEDURE — V5010 ASSESSMENT FOR HEARING AID: HCPCS | Performed by: AUDIOLOGIST

## 2024-12-16 NOTE — PROGRESS NOTES
AUDIOLOGY REPORT    SUBJECTIVE: Jones Miller is a 59 year old male who was seen in the Audiology Clinic at the [facility] on 12/16/2024 for a hearing aid check. Previous results have revealed normal hearing through 2000 Hz sloping to mild sensorineural hearing loss, bilaterally. The patient has trialed Oticon Intent 1 miniRITE R hearing aids 09/10/2024 and Phonak Audeo T05-Ieivsa R 10/28/2024, but both were returned due to perceived lack of significant benefit, issues with bluetooth connectivity and sound quality, and with the Phonak aids fit behind the ear. Jones was fit with [employer] Edge AI 24 mRI R -in-the-ear hearing aids 11/15/2024. Today, he reported that the Rosalia aids have been the best for him from a hearing standpoint, but that he has been unable to use the bluetooth connectivity because the aids will not pair to his phone. He still feels that the benefit for hearing is minimal, but is worried about prevention of cognitive impairment and would still like to proceed with wearing hearing aids. He does note some irritation with touch noise and some soft environmental sounds (paper crinkling, etc).    OBJECTIVE:   Based on patient report, the following changes were made:  1) Gain for soft low frequency inputs was decreased by 3 steps. This did help with touch noise and crinkling.  2) Did some troubleshooting with connectivity and were able to get the right aid to consistently connect with the phone and mino, but not the left. Note that the hearing aids were not charged when brought in - not sure whether battery life might have affected the connectivity in the office today. Could work further on troubleshooting with the help of calling [employer] support, but the patient felt he did not have valencia in the product in terms of connectivity and asked if we could please order new devices.   3) Patient is also interested in a Premium  that holds a charge - will order and  patient willing to pay upgrade cost.  4) Patient interested in the Remote Ozzy + as his included accessory once this becomes available for this technology.    No charge visit today (in warranty hearing aid check).    ASSESSMENT: A follow-up appointment for hearing aid fitting was completed today. Changes to hearing aid was completed as outlined above.     PLAN: Jones will keep the current hearing aids for now so that he can continue to wear them. He will let me know if the left aid does in fact start consistently connecting. Assuming not, will order a new set of aids along with a premium  to be swapped out for the current set (even exchange except for addition of upgrade for premium ). The patient's programming will be put into the new aids and he will be messaged through [employer] to pick them up at the  and return the current set (and ). We will schedule follow-up as needed following. Please call this clinic with any questions regarding today s appointment.      Felisa Khoury., [facility]-A  [place] Licensed Audiologist #[id number]

## 2024-12-19 ENCOUNTER — PATIENT OUTREACH (OUTPATIENT)
Dept: CARE COORDINATION | Facility: CLINIC | Age: 59
End: 2024-12-19
Payer: COMMERCIAL

## 2025-01-02 ENCOUNTER — PATIENT OUTREACH (OUTPATIENT)
Dept: CARE COORDINATION | Facility: CLINIC | Age: 60
End: 2025-01-02
Payer: COMMERCIAL

## 2025-03-15 ENCOUNTER — HEALTH MAINTENANCE LETTER (OUTPATIENT)
Age: 60
End: 2025-03-15

## 2025-03-17 ENCOUNTER — DOCUMENTATION ONLY (OUTPATIENT)
Dept: AUDIOLOGY | Facility: CLINIC | Age: 60
End: 2025-03-17
Payer: COMMERCIAL

## 2025-03-17 NOTE — PROGRESS NOTES
Communicated via email with [facility], who confirmed they will accept the hearing aids as a return though we are at the 90 day return period. Use reference #[id number] for the return (per Luther).

## 2025-03-22 ENCOUNTER — MYC MEDICAL ADVICE (OUTPATIENT)
Dept: FAMILY MEDICINE | Facility: CLINIC | Age: 60
End: 2025-03-22
Payer: COMMERCIAL

## 2025-03-22 DIAGNOSIS — E78.1 HYPERTRIGLYCERIDEMIA: ICD-10-CM

## 2025-03-22 DIAGNOSIS — E78.00 HYPERCHOLESTEREMIA: ICD-10-CM

## 2025-03-22 DIAGNOSIS — E88.810 METABOLIC SYNDROME: ICD-10-CM

## 2025-03-22 DIAGNOSIS — E66.811 CLASS 1 OBESITY DUE TO EXCESS CALORIES WITH SERIOUS COMORBIDITY AND BODY MASS INDEX (BMI) OF 31.0 TO 31.9 IN ADULT: ICD-10-CM

## 2025-03-22 DIAGNOSIS — E66.09 CLASS 1 OBESITY DUE TO EXCESS CALORIES WITH SERIOUS COMORBIDITY AND BODY MASS INDEX (BMI) OF 31.0 TO 31.9 IN ADULT: ICD-10-CM

## 2025-03-24 NOTE — TELEPHONE ENCOUNTER
Patient requesting refill of Zepbound 15 mg.      Rx pended for review/approval, if approval.        Kayli Pederson RN  [facility]

## 2025-03-27 ENCOUNTER — OFFICE VISIT (OUTPATIENT)
Dept: FAMILY MEDICINE | Facility: CLINIC | Age: 60
End: 2025-03-27
Attending: FAMILY MEDICINE
Payer: COMMERCIAL

## 2025-03-27 VITALS
WEIGHT: 222.6 LBS | OXYGEN SATURATION: 98 % | BODY MASS INDEX: 30.15 KG/M2 | SYSTOLIC BLOOD PRESSURE: 106 MMHG | HEIGHT: 72 IN | TEMPERATURE: 97.6 F | RESPIRATION RATE: 16 BRPM | HEART RATE: 69 BPM | DIASTOLIC BLOOD PRESSURE: 69 MMHG

## 2025-03-27 DIAGNOSIS — Z13.29 SCREENING FOR ENDOCRINE, NUTRITIONAL, METABOLIC AND IMMUNITY DISORDER: ICD-10-CM

## 2025-03-27 DIAGNOSIS — E78.1 HYPERTRIGLYCERIDEMIA: ICD-10-CM

## 2025-03-27 DIAGNOSIS — Z13.0 SCREENING FOR ENDOCRINE, NUTRITIONAL, METABOLIC AND IMMUNITY DISORDER: ICD-10-CM

## 2025-03-27 DIAGNOSIS — Z13.1 SCREENING FOR DIABETES MELLITUS: ICD-10-CM

## 2025-03-27 DIAGNOSIS — E88.810 METABOLIC SYNDROME: ICD-10-CM

## 2025-03-27 DIAGNOSIS — Z13.220 SCREENING FOR LIPID DISORDERS: ICD-10-CM

## 2025-03-27 DIAGNOSIS — E55.9 AVITAMINOSIS D: ICD-10-CM

## 2025-03-27 DIAGNOSIS — E66.09 CLASS 1 OBESITY DUE TO EXCESS CALORIES WITH SERIOUS COMORBIDITY AND BODY MASS INDEX (BMI) OF 30.0 TO 30.9 IN ADULT: Primary | ICD-10-CM

## 2025-03-27 DIAGNOSIS — R68.82 DECREASED LIBIDO: ICD-10-CM

## 2025-03-27 DIAGNOSIS — Z13.21 SCREENING FOR ENDOCRINE, NUTRITIONAL, METABOLIC AND IMMUNITY DISORDER: ICD-10-CM

## 2025-03-27 DIAGNOSIS — E78.00 HYPERCHOLESTEREMIA: ICD-10-CM

## 2025-03-27 DIAGNOSIS — Z12.5 SCREENING FOR PROSTATE CANCER: ICD-10-CM

## 2025-03-27 DIAGNOSIS — E66.811 CLASS 1 OBESITY DUE TO EXCESS CALORIES WITH SERIOUS COMORBIDITY AND BODY MASS INDEX (BMI) OF 30.0 TO 30.9 IN ADULT: Primary | ICD-10-CM

## 2025-03-27 DIAGNOSIS — Z13.228 SCREENING FOR ENDOCRINE, NUTRITIONAL, METABOLIC AND IMMUNITY DISORDER: ICD-10-CM

## 2025-03-27 LAB
ALT SERPL W P-5'-P-CCNC: 21 U/L (ref 0–70)
ANION GAP SERPL CALCULATED.3IONS-SCNC: 9 MMOL/L (ref 7–15)
BUN SERPL-MCNC: 13.1 MG/DL (ref 8–23)
CALCIUM SERPL-MCNC: 9.1 MG/DL (ref 8.8–10.4)
CHLORIDE SERPL-SCNC: 107 MMOL/L (ref 98–107)
CHOLEST SERPL-MCNC: 185 MG/DL
CREAT SERPL-MCNC: 0.96 MG/DL (ref 0.67–1.17)
EGFRCR SERPLBLD CKD-EPI 2021: 90 ML/MIN/1.73M2
FASTING STATUS PATIENT QL REPORTED: YES
FASTING STATUS PATIENT QL REPORTED: YES
GLUCOSE SERPL-MCNC: 93 MG/DL (ref 70–99)
HCO3 SERPL-SCNC: 24 MMOL/L (ref 22–29)
HDLC SERPL-MCNC: 41 MG/DL
LDLC SERPL CALC-MCNC: 128 MG/DL
NONHDLC SERPL-MCNC: 144 MG/DL
POTASSIUM SERPL-SCNC: 4.3 MMOL/L (ref 3.4–5.3)
PSA SERPL DL<=0.01 NG/ML-MCNC: 0.95 NG/ML (ref 0–4.5)
SHBG SERPL-SCNC: 23 NMOL/L (ref 11–80)
SODIUM SERPL-SCNC: 140 MMOL/L (ref 135–145)
TRIGL SERPL-MCNC: 82 MG/DL
VIT D+METAB SERPL-MCNC: 23 NG/ML (ref 20–50)

## 2025-03-27 ASSESSMENT — PATIENT HEALTH QUESTIONNAIRE - PHQ9
10. IF YOU CHECKED OFF ANY PROBLEMS, HOW DIFFICULT HAVE THESE PROBLEMS MADE IT FOR YOU TO DO YOUR WORK, TAKE CARE OF THINGS AT HOME, OR GET ALONG WITH OTHER PEOPLE: SOMEWHAT DIFFICULT
SUM OF ALL RESPONSES TO PHQ QUESTIONS 1-9: 4
SUM OF ALL RESPONSES TO PHQ QUESTIONS 1-9: 4

## 2025-03-27 NOTE — PATIENT INSTRUCTIONS
[email or web address].

## 2025-03-27 NOTE — PROGRESS NOTES
"  Assessment & Plan   Problem List Items Addressed This Visit       Avitaminosis D    Relevant Orders    Vitamin D Deficiency    Class 1 obesity due to excess calories with serious comorbidity and body mass index (BMI) of 30.0 to 30.9 in adult - Primary     59 year old year old male in clinic today to discuss treatment of the following conditions through diet and lifestyle modification and weight loss:  1. Class 1 obesity due to excess calories with serious comorbidity and body mass index (BMI) of 30.0 to 30.9 in adult    2. Hypertriglyceridemia    3. Hypercholesteremia    4. Metabolic syndrome    5. Decreased libido    6. Screening for endocrine, nutritional, metabolic and immunity disorder    7. Screening for diabetes mellitus    8. Screening for lipid disorders    9. Screening for prostate cancer    10. Avitaminosis D      The patient's weight loss result since the last visit was successful based on weight loss and lifestyle.  He feels a jayda stuck lately. He notes that libido has dropped. He actually wonders about viral illness given this change.  Overall, lifestyle improved.  We will check testosterone level.  We will check his metabolic labs given the blood draw and he is fasting.  Overall he is doing well.    BMI: Body mass index is 30.61 kg/m .  Ideal body weight: 76.5 kg (168 lb 8.7 oz)  Adjusted ideal body weight: 86.3 kg (190 lb 2.6 oz)  Last clinic measured weight:   Wt Readings from Last 1 Encounters:   03/27/25 101 kg (222 lb 9.6 oz)     Last home measurement:       10/11/2022     8:57 AM   Vitals - Patient Reported   Height (Patient Reported) 6' 0\"   Weight (Patient Reported) 246 lb 9.6 oz   BMI (Based on Pt Reported Ht/Wt) 33.44 kg/m2       Current therapies:    Medications: YES zepbound   - Starting weight for GLP1 receptor agonist: 242 lbs   - Total weight loss: 20 lbs (8.3%)    Nutritional goals/strategies: reviewed.   - caloric restriction: Yes   - time restriction: NO   - diet (macronutrient) " "framework: high protein/low carbohydrate    Movement:   - predominantly cardiovascular    Sleep and stress:   - \"anxiety dreams.\"     Follow-up: 6 months           Relevant Medications    tirzepatide-Weight Management (ZEPBOUND) 15 MG/0.5ML prefilled pen    Hypercholesteremia    Relevant Medications    tirzepatide-Weight Management (ZEPBOUND) 15 MG/0.5ML prefilled pen    Hypertriglyceridemia    Relevant Medications    tirzepatide-Weight Management (ZEPBOUND) 15 MG/0.5ML prefilled pen    Metabolic syndrome    Relevant Medications    tirzepatide-Weight Management (ZEPBOUND) 15 MG/0.5ML prefilled pen     Other Visit Diagnoses       Decreased libido        Relevant Medications    tirzepatide-Weight Management (ZEPBOUND) 15 MG/0.5ML prefilled pen    Other Relevant Orders    Testosterone Free and Total    Screening for endocrine, nutritional, metabolic and immunity disorder        Screening for diabetes mellitus        Relevant Orders    Basic metabolic panel  (Ca, Cl, CO2, Creat, Gluc, K, Na, BUN)    ALT    Screening for lipid disorders        Relevant Orders    Basic metabolic panel  (Ca, Cl, CO2, Creat, Gluc, K, Na, BUN)    ALT    Lipid panel reflex to direct LDL Fasting    Screening for prostate cancer        Relevant Orders    PSA, screen                  BMI  Estimated body mass index is 30.61 kg/m  as calculated from the following:    Height as of this encounter: 1.816 m (5' 11.5\").    Weight as of this encounter: 101 kg (222 lb 9.6 oz).   Weight management plan: Discussed healthy diet and exercise guidelines    The longitudinal plan of care for the diagnosis(es)/condition(s) as documented were addressed during this visit. Due to the added complexity in care, I will continue to support Jones in the subsequent management and with ongoing continuity of care.      Subjective   Jones is a 59 year old, presenting for the following health issues:  Weight Loss (Follow-up )        3/27/2025     6:52 AM   Additional " "Questions   Roomed by xl     Patient presents for treatment of chronic, comorbid conditions using intensive therapeutic lifestyle interventions including nutrition, physical activity, and behavior management.   - successes: happy to have BMI approaching overweight category.  Not thinking aobut food.  \"My next meal is not occupying my brain at all times.\"    - struggles: sweet tooth back a bit.  \"I need a bit more discipline.\"    - movement: yoga, longer walking.  Three mile loop.     - following nutritional plan: yes.  Deviations from plan: convenience.   - hunger: Stable.    - medication benefits: helpful. Side effects: none      History of Present Illness       Reason for visit:  Follow up with [facility] treatment    He eats 2-3 servings of fruits and vegetables daily.He consumes 0 sweetened beverage(s) daily.He exercises with enough effort to increase his heart rate 20 to 29 minutes per day.  He exercises with enough effort to increase his heart rate 5 days per week. He is missing 2 dose(s) of medications per week.  He is not taking prescribed medications regularly due to remembering to take.            Objective    /69 (BP Location: Left arm, Patient Position: Sitting, Cuff Size: Adult Large)   Pulse 69   Temp 97.6  F (36.4  C) (Oral)   Resp 16   Ht 1.816 m (5' 11.5\")   Wt 101 kg (222 lb 9.6 oz)   SpO2 98%   BMI 30.61 kg/m    Body mass index is 30.61 kg/m .  Physical Exam  Nursing note reviewed.   Constitutional:       General: He is not in acute distress.     Appearance: Normal appearance. He is not ill-appearing.   HENT:      Head: Normocephalic and atraumatic.   Eyes:      Extraocular Movements: Extraocular movements intact.      Conjunctiva/sclera: Conjunctivae normal.   Pulmonary:      Effort: Pulmonary effort is normal.   Neurological:      Mental Status: He is alert and oriented to person, place, and time.   Psychiatric:         Attention and Perception: Attention normal.         Mood and " Affect: Mood normal.         Speech: Speech normal.         Thought Content: Thought content normal.                    Signed Electronically by: Feliberto Knight MD

## 2025-03-27 NOTE — ASSESSMENT & PLAN NOTE
"59 year old year old male in clinic today to discuss treatment of the following conditions through diet and lifestyle modification and weight loss:  1. Class 1 obesity due to excess calories with serious comorbidity and body mass index (BMI) of 30.0 to 30.9 in adult    2. Hypertriglyceridemia    3. Hypercholesteremia    4. Metabolic syndrome    5. Decreased libido    6. Screening for endocrine, nutritional, metabolic and immunity disorder    7. Screening for diabetes mellitus    8. Screening for lipid disorders    9. Screening for prostate cancer    10. Avitaminosis D      The patient's weight loss result since the last visit was successful based on weight loss and lifestyle.  He feels a jayda stuck lately. He notes that libido has dropped. He actually wonders about viral illness given this change.  Overall, lifestyle improved.  We will check testosterone level.  We will check his metabolic labs given the blood draw and he is fasting.  Overall he is doing well.    BMI: Body mass index is 30.61 kg/m .  Ideal body weight: 76.5 kg (168 lb 8.7 oz)  Adjusted ideal body weight: 86.3 kg (190 lb 2.6 oz)  Last clinic measured weight:   Wt Readings from Last 1 Encounters:   03/27/25 101 kg (222 lb 9.6 oz)     Last home measurement:       10/11/2022     8:57 AM   Vitals - Patient Reported   Height (Patient Reported) 6' 0\"   Weight (Patient Reported) 246 lb 9.6 oz   BMI (Based on Pt Reported Ht/Wt) 33.44 kg/m2       Current therapies:    Medications: YES zepbound   - Starting weight for GLP1 receptor agonist: 242 lbs   - Total weight loss: 20 lbs (8.3%)    Nutritional goals/strategies: reviewed.   - caloric restriction: Yes   - time restriction: NO   - diet (macronutrient) framework: high protein/low carbohydrate    Movement:   - predominantly cardiovascular    Sleep and stress:   - \"anxiety dreams.\"     Follow-up: 6 months    "

## 2025-03-31 LAB
TESTOST FREE SERPL-MCNC: 5.57 NG/DL
TESTOST SERPL-MCNC: 238 NG/DL (ref 240–950)

## 2025-04-05 ENCOUNTER — MYC MEDICAL ADVICE (OUTPATIENT)
Dept: FAMILY MEDICINE | Facility: CLINIC | Age: 60
End: 2025-04-05
Payer: COMMERCIAL

## 2025-04-05 DIAGNOSIS — R79.89 LOW TESTOSTERONE IN MALE: Primary | ICD-10-CM

## 2025-08-10 ENCOUNTER — TELEPHONE (OUTPATIENT)
Dept: FAMILY MEDICINE | Facility: CLINIC | Age: 60
End: 2025-08-10
Payer: COMMERCIAL

## 2025-08-13 ENCOUNTER — ALLIED HEALTH/NURSE VISIT (OUTPATIENT)
Dept: FAMILY MEDICINE | Facility: CLINIC | Age: 60
End: 2025-08-13
Payer: COMMERCIAL

## 2025-08-13 VITALS — WEIGHT: 218.6 LBS | BODY MASS INDEX: 29.61 KG/M2 | HEIGHT: 72 IN

## 2025-08-13 DIAGNOSIS — R63.4 WEIGHT LOSS: Primary | ICD-10-CM

## 2025-08-13 PROCEDURE — 99207 PR NO CHARGE NURSE ONLY: CPT
